# Patient Record
Sex: MALE | Race: WHITE | Employment: FULL TIME | URBAN - METROPOLITAN AREA
[De-identification: names, ages, dates, MRNs, and addresses within clinical notes are randomized per-mention and may not be internally consistent; named-entity substitution may affect disease eponyms.]

---

## 2017-06-26 ENCOUNTER — TELEPHONE (OUTPATIENT)
Dept: INTERNAL MEDICINE CLINIC | Facility: CLINIC | Age: 67
End: 2017-06-26

## 2017-06-26 ENCOUNTER — APPOINTMENT (OUTPATIENT)
Dept: LAB | Age: 67
End: 2017-06-26
Attending: INTERNAL MEDICINE
Payer: COMMERCIAL

## 2017-06-26 DIAGNOSIS — E78.2 MIXED HYPERLIPIDEMIA: ICD-10-CM

## 2017-06-26 DIAGNOSIS — I10 ESSENTIAL HYPERTENSION: ICD-10-CM

## 2017-06-26 DIAGNOSIS — E78.2 MIXED HYPERLIPIDEMIA: Primary | ICD-10-CM

## 2017-06-26 PROCEDURE — 80061 LIPID PANEL: CPT | Performed by: INTERNAL MEDICINE

## 2017-06-26 PROCEDURE — 80053 COMPREHEN METABOLIC PANEL: CPT | Performed by: INTERNAL MEDICINE

## 2017-06-26 PROCEDURE — 81003 URINALYSIS AUTO W/O SCOPE: CPT | Performed by: INTERNAL MEDICINE

## 2017-06-26 PROCEDURE — 36415 COLL VENOUS BLD VENIPUNCTURE: CPT | Performed by: INTERNAL MEDICINE

## 2017-06-26 NOTE — TELEPHONE ENCOUNTER
Patient walked up to the , and is fasting for labs, but no orders were placed. Please enter orders for [address].

## 2017-06-30 ENCOUNTER — OFFICE VISIT (OUTPATIENT)
Dept: INTERNAL MEDICINE CLINIC | Facility: CLINIC | Age: 67
End: 2017-06-30

## 2017-06-30 VITALS
TEMPERATURE: 99 F | SYSTOLIC BLOOD PRESSURE: 130 MMHG | WEIGHT: 212 LBS | RESPIRATION RATE: 14 BRPM | DIASTOLIC BLOOD PRESSURE: 86 MMHG | BODY MASS INDEX: 30.35 KG/M2 | HEART RATE: 60 BPM | HEIGHT: 70 IN

## 2017-06-30 DIAGNOSIS — E78.2 MIXED HYPERLIPIDEMIA: ICD-10-CM

## 2017-06-30 DIAGNOSIS — I10 ESSENTIAL HYPERTENSION: Primary | ICD-10-CM

## 2017-06-30 DIAGNOSIS — I25.10 CAD IN NATIVE ARTERY: ICD-10-CM

## 2017-06-30 DIAGNOSIS — Z12.11 COLON CANCER SCREENING: ICD-10-CM

## 2017-06-30 PROCEDURE — 99213 OFFICE O/P EST LOW 20 MIN: CPT | Performed by: INTERNAL MEDICINE

## 2017-06-30 RX ORDER — LOSARTAN POTASSIUM 50 MG/1
50 TABLET ORAL DAILY
Qty: 90 TABLET | Refills: 1 | Status: SHIPPED | OUTPATIENT
Start: 2017-06-30 | End: 2017-12-28

## 2017-06-30 RX ORDER — ATORVASTATIN CALCIUM 40 MG/1
40 TABLET, FILM COATED ORAL DAILY
Qty: 90 TABLET | Refills: 1 | Status: SHIPPED | OUTPATIENT
Start: 2017-06-30 | End: 2017-12-10

## 2017-06-30 NOTE — PROGRESS NOTES
HPI:    Patient ID: Veda Willis is a 69year old male. HPI  Veda Willis is a 69year old male. HPI:   Patient presents for recheck of his hypertension, hyperlipidemia and CAD.  Pt has been taking medications as instructed, no medication raquel Packs/day: 0.00      Years: 0.00         Quit date: 10/30/2013  Smokeless tobacco: Never Used                      Alcohol use:  No               Comment: quit        REVIEW OF SYSTEMS:   GENERAL H PHYSICAL EXAM:   Physical Exam           ASSESSMENT/PLAN:   Mixed hyperlipidemia  Essential hypertension  (primary encounter diagnosis)  Cad in native artery 2013 s/p mi 2013 promus elemant plus stent lad (distal) dr. Fonseca First  Colon cancer screening      Or

## 2017-06-30 NOTE — PATIENT INSTRUCTIONS
Eating Heart-Healthy Foods  Eating has a big impact on your heart health. In fact, eating healthier can improve several of your heart risks at once. For instance, it helps you manage weight, cholesterol, and blood pressure.  Here are ideas to help you radha Aim to make these foods staples of your diet. If you have diabetes, you may have different recommendations than what is listed here:  · Fruits and vegetable provide plenty of nutrients without a lot of calories.  At meals, fill half your plate with these fo · Choose ingredients that spice up your food without adding calories, fat, or sodium. Try these items: horseradish, hot sauce, lemon, mustard, nonfat salad dressings, and vinegar.  For salt-free herbs and spices, try basil, cilantro, cinnamon, pepper, and r

## 2017-10-11 ENCOUNTER — NURSE ONLY (OUTPATIENT)
Dept: INTERNAL MEDICINE CLINIC | Facility: CLINIC | Age: 67
End: 2017-10-11

## 2017-10-11 DIAGNOSIS — Z23 NEED FOR PROPHYLACTIC VACCINATION AND INOCULATION AGAINST INFLUENZA: Primary | ICD-10-CM

## 2017-10-11 PROCEDURE — 90653 IIV ADJUVANT VACCINE IM: CPT | Performed by: INTERNAL MEDICINE

## 2017-10-11 PROCEDURE — 90471 IMMUNIZATION ADMIN: CPT | Performed by: INTERNAL MEDICINE

## 2017-10-27 ENCOUNTER — MED REC SCAN ONLY (OUTPATIENT)
Dept: INTERNAL MEDICINE CLINIC | Facility: CLINIC | Age: 67
End: 2017-10-27

## 2017-12-10 DIAGNOSIS — E78.2 MIXED HYPERLIPIDEMIA: ICD-10-CM

## 2017-12-10 DIAGNOSIS — I25.10 CAD IN NATIVE ARTERY: ICD-10-CM

## 2017-12-11 RX ORDER — ATORVASTATIN CALCIUM 40 MG/1
TABLET, FILM COATED ORAL
Qty: 30 TABLET | Refills: 5 | Status: SHIPPED | OUTPATIENT
Start: 2017-12-11 | End: 2019-01-07

## 2017-12-15 ENCOUNTER — TELEPHONE (OUTPATIENT)
Dept: INTERNAL MEDICINE CLINIC | Facility: CLINIC | Age: 67
End: 2017-12-15

## 2017-12-15 DIAGNOSIS — Z13.89 SCREENING FOR BLOOD OR PROTEIN IN URINE: ICD-10-CM

## 2017-12-15 DIAGNOSIS — Z13.220 SCREENING FOR LIPOID DISORDERS: ICD-10-CM

## 2017-12-15 DIAGNOSIS — Z13.29 SCREENING FOR ENDOCRINE/METABOLIC/IMMUNITY DISORDERS: ICD-10-CM

## 2017-12-15 DIAGNOSIS — Z13.0 SCREENING FOR ENDOCRINE/METABOLIC/IMMUNITY DISORDERS: ICD-10-CM

## 2017-12-15 DIAGNOSIS — Z13.29 SCREENING FOR THYROID DISORDER: ICD-10-CM

## 2017-12-15 DIAGNOSIS — Z13.1 SCREENING FOR DIABETES MELLITUS: ICD-10-CM

## 2017-12-15 DIAGNOSIS — Z13.0 SCREENING FOR IRON DEFICIENCY ANEMIA: ICD-10-CM

## 2017-12-15 DIAGNOSIS — Z12.5 SPECIAL SCREENING FOR MALIGNANT NEOPLASM OF PROSTATE: ICD-10-CM

## 2017-12-15 DIAGNOSIS — Z13.228 SCREENING FOR ENDOCRINE/METABOLIC/IMMUNITY DISORDERS: ICD-10-CM

## 2017-12-15 NOTE — TELEPHONE ENCOUNTER
Patient called and requested labs to be ordered for his upcoming physical on 12/28/2017 with Dr. Cullen Patient.  Please enter labs for [facility] reference, and patient is aware to fast and provide U/A.

## 2017-12-18 ENCOUNTER — LAB ENCOUNTER (OUTPATIENT)
Dept: LAB | Age: 67
End: 2017-12-18
Attending: INTERNAL MEDICINE
Payer: COMMERCIAL

## 2017-12-18 ENCOUNTER — APPOINTMENT (OUTPATIENT)
Dept: LAB | Facility: HOSPITAL | Age: 67
End: 2017-12-18
Attending: INTERNAL MEDICINE
Payer: COMMERCIAL

## 2017-12-18 DIAGNOSIS — Z13.1 SCREENING FOR DIABETES MELLITUS: ICD-10-CM

## 2017-12-18 DIAGNOSIS — Z13.220 SCREENING FOR LIPOID DISORDERS: ICD-10-CM

## 2017-12-18 DIAGNOSIS — Z12.5 SPECIAL SCREENING FOR MALIGNANT NEOPLASM OF PROSTATE: ICD-10-CM

## 2017-12-18 DIAGNOSIS — Z13.0 SCREENING FOR IRON DEFICIENCY ANEMIA: ICD-10-CM

## 2017-12-18 DIAGNOSIS — Z13.29 SCREENING FOR ENDOCRINE/METABOLIC/IMMUNITY DISORDERS: ICD-10-CM

## 2017-12-18 DIAGNOSIS — Z13.89 SCREENING FOR BLOOD OR PROTEIN IN URINE: ICD-10-CM

## 2017-12-18 DIAGNOSIS — Z13.0 SCREENING FOR ENDOCRINE/METABOLIC/IMMUNITY DISORDERS: ICD-10-CM

## 2017-12-18 DIAGNOSIS — Z13.228 SCREENING FOR ENDOCRINE/METABOLIC/IMMUNITY DISORDERS: ICD-10-CM

## 2017-12-18 DIAGNOSIS — Z13.29 SCREENING FOR THYROID DISORDER: ICD-10-CM

## 2017-12-18 DIAGNOSIS — Z12.11 COLON CANCER SCREENING: ICD-10-CM

## 2017-12-18 PROCEDURE — 81001 URINALYSIS AUTO W/SCOPE: CPT | Performed by: INTERNAL MEDICINE

## 2017-12-18 PROCEDURE — 82272 OCCULT BLD FECES 1-3 TESTS: CPT

## 2017-12-18 PROCEDURE — 36415 COLL VENOUS BLD VENIPUNCTURE: CPT | Performed by: INTERNAL MEDICINE

## 2017-12-18 PROCEDURE — 80061 LIPID PANEL: CPT | Performed by: INTERNAL MEDICINE

## 2017-12-18 PROCEDURE — 84153 ASSAY OF PSA TOTAL: CPT | Performed by: INTERNAL MEDICINE

## 2017-12-18 PROCEDURE — 80050 GENERAL HEALTH PANEL: CPT | Performed by: INTERNAL MEDICINE

## 2017-12-18 PROCEDURE — 83036 HEMOGLOBIN GLYCOSYLATED A1C: CPT | Performed by: INTERNAL MEDICINE

## 2017-12-28 ENCOUNTER — OFFICE VISIT (OUTPATIENT)
Dept: INTERNAL MEDICINE CLINIC | Facility: CLINIC | Age: 67
End: 2017-12-28

## 2017-12-28 VITALS
HEART RATE: 68 BPM | OXYGEN SATURATION: 97 % | HEIGHT: 70 IN | DIASTOLIC BLOOD PRESSURE: 88 MMHG | SYSTOLIC BLOOD PRESSURE: 130 MMHG | TEMPERATURE: 98 F | WEIGHT: 212 LBS | RESPIRATION RATE: 16 BRPM | BODY MASS INDEX: 30.35 KG/M2

## 2017-12-28 DIAGNOSIS — E87.5 HYPERKALEMIA: ICD-10-CM

## 2017-12-28 DIAGNOSIS — Z00.00 PHYSICAL EXAM, ANNUAL: Primary | ICD-10-CM

## 2017-12-28 DIAGNOSIS — Z12.11 COLON CANCER SCREENING: ICD-10-CM

## 2017-12-28 DIAGNOSIS — I10 ESSENTIAL HYPERTENSION: ICD-10-CM

## 2017-12-28 PROCEDURE — 99397 PER PM REEVAL EST PAT 65+ YR: CPT | Performed by: INTERNAL MEDICINE

## 2017-12-28 RX ORDER — LOSARTAN POTASSIUM 50 MG/1
50 TABLET ORAL DAILY
Qty: 90 TABLET | Refills: 1 | Status: SHIPPED | OUTPATIENT
Start: 2017-12-28 | End: 2018-06-12

## 2017-12-28 NOTE — PROGRESS NOTES
HPI:    Patient ID: Galo Moore is a 69year old male. HPI  Galo Moore is a 69year old male who presents for a complete physical exam.   HPI:   Pt complains of nothing    PAST MEDICAL, SOCIAL, FAMILY HISTORIES REVIEWED WITH PT  .       Ambrosio Small ofloxacin 0.3 % Ophthalmic Solution 1 drop 4 times a day to operated eye starting 1 day prior to surgery and every 2 hours after surgery Disp: 1 Bottle Rfl: 1   prednisoLONE acetate (PRED FORTE) 1 % Ophthalmic Suspension 1 drop to affected eye QID Disp: EOMI, normal optic disk,conjunctiva are clear  NECK: supple,no adenopathy,no bruits  LUNGS: clear to auscultation  CARDIO: RRR without murmur  GI: good BS's,no masses, HSM or tenderness  : two descended testes,no masses,no hernia,no penile lesions  RECTA Essential hypertension  Colon cancer screening  Hyperkalemia  Physical exam, annual  (primary encounter diagnosis)      Orders Placed This Encounter      Occult Blood, Fecal, Immunoassay (FIT) [E]      Potassium [E]    Meds This Visit:  Signed Kaylen Chandler

## 2018-05-11 ENCOUNTER — MED REC SCAN ONLY (OUTPATIENT)
Dept: INTERNAL MEDICINE CLINIC | Facility: CLINIC | Age: 68
End: 2018-05-11

## 2018-05-29 ENCOUNTER — LAB ENCOUNTER (OUTPATIENT)
Dept: LAB | Facility: HOSPITAL | Age: 68
End: 2018-05-29
Attending: INTERNAL MEDICINE
Payer: COMMERCIAL

## 2018-05-29 DIAGNOSIS — Z12.11 SPECIAL SCREENING FOR MALIGNANT NEOPLASMS, COLON: Primary | ICD-10-CM

## 2018-05-29 PROCEDURE — 82272 OCCULT BLD FECES 1-3 TESTS: CPT

## 2018-06-12 DIAGNOSIS — I10 ESSENTIAL HYPERTENSION: ICD-10-CM

## 2018-06-12 RX ORDER — LOSARTAN POTASSIUM 50 MG/1
TABLET ORAL
Qty: 30 TABLET | Refills: 1 | Status: SHIPPED | OUTPATIENT
Start: 2018-06-12 | End: 2018-07-05 | Stop reason: ALTCHOICE

## 2018-06-27 ENCOUNTER — TELEPHONE (OUTPATIENT)
Dept: INTERNAL MEDICINE CLINIC | Facility: CLINIC | Age: 68
End: 2018-06-27

## 2018-06-27 DIAGNOSIS — E78.2 MIXED HYPERLIPIDEMIA: Primary | ICD-10-CM

## 2018-06-27 DIAGNOSIS — I10 ESSENTIAL HYPERTENSION: ICD-10-CM

## 2018-06-27 DIAGNOSIS — I25.10 CAD IN NATIVE ARTERY: ICD-10-CM

## 2018-06-27 NOTE — TELEPHONE ENCOUNTER
Las physical was 12/2017 and not due for full panel until 12/2018.    Follow up labs ordered per Dr. Maryse Beasley

## 2018-06-28 ENCOUNTER — LAB ENCOUNTER (OUTPATIENT)
Dept: LAB | Age: 68
End: 2018-06-28
Attending: INTERNAL MEDICINE
Payer: COMMERCIAL

## 2018-06-28 DIAGNOSIS — E78.2 MIXED HYPERLIPIDEMIA: ICD-10-CM

## 2018-06-28 DIAGNOSIS — I10 ESSENTIAL HYPERTENSION: ICD-10-CM

## 2018-06-28 DIAGNOSIS — I25.10 CAD IN NATIVE ARTERY: ICD-10-CM

## 2018-06-28 PROCEDURE — 81003 URINALYSIS AUTO W/O SCOPE: CPT | Performed by: INTERNAL MEDICINE

## 2018-06-28 PROCEDURE — 85025 COMPLETE CBC W/AUTO DIFF WBC: CPT | Performed by: INTERNAL MEDICINE

## 2018-06-28 PROCEDURE — 80053 COMPREHEN METABOLIC PANEL: CPT | Performed by: INTERNAL MEDICINE

## 2018-06-28 PROCEDURE — 36415 COLL VENOUS BLD VENIPUNCTURE: CPT | Performed by: INTERNAL MEDICINE

## 2018-07-05 ENCOUNTER — OFFICE VISIT (OUTPATIENT)
Dept: INTERNAL MEDICINE CLINIC | Facility: CLINIC | Age: 68
End: 2018-07-05

## 2018-07-05 VITALS
SYSTOLIC BLOOD PRESSURE: 116 MMHG | BODY MASS INDEX: 30.06 KG/M2 | WEIGHT: 210 LBS | RESPIRATION RATE: 16 BRPM | DIASTOLIC BLOOD PRESSURE: 86 MMHG | HEART RATE: 57 BPM | TEMPERATURE: 98 F | HEIGHT: 70 IN

## 2018-07-05 DIAGNOSIS — I10 ESSENTIAL HYPERTENSION: Primary | ICD-10-CM

## 2018-07-05 DIAGNOSIS — E78.2 MIXED HYPERLIPIDEMIA: ICD-10-CM

## 2018-07-05 PROCEDURE — 99213 OFFICE O/P EST LOW 20 MIN: CPT | Performed by: INTERNAL MEDICINE

## 2018-07-05 RX ORDER — LOSARTAN POTASSIUM AND HYDROCHLOROTHIAZIDE 12.5; 5 MG/1; MG/1
1 TABLET ORAL
COMMUNITY
Start: 2018-05-02 | End: 2019-01-07

## 2018-07-05 NOTE — PROGRESS NOTES
Zacarias Richradson is a 77year old male. HPI:   Patient presents for recheck of his hypertension and hyperlipidemia. Pt has been taking medications as instructed, no medication side effects, denies home BP monitoring. Denies any complaints today.   Katey Márquez Smoking status: Former Smoker                                                              Packs/day: 0.00      Years: 0.00         Quit date: 10/30/2013  Smokeless tobacco: Never Used                      Alcohol use:  No               Comment: quit

## 2018-08-06 DIAGNOSIS — I10 ESSENTIAL HYPERTENSION: ICD-10-CM

## 2018-08-06 RX ORDER — LOSARTAN POTASSIUM 50 MG/1
TABLET ORAL
Qty: 30 TABLET | Refills: 0 | OUTPATIENT
Start: 2018-08-06

## 2018-12-27 ENCOUNTER — APPOINTMENT (OUTPATIENT)
Dept: LAB | Age: 68
End: 2018-12-27
Attending: INTERNAL MEDICINE
Payer: COMMERCIAL

## 2018-12-27 DIAGNOSIS — E87.5 HYPERKALEMIA: ICD-10-CM

## 2018-12-27 PROCEDURE — 36415 COLL VENOUS BLD VENIPUNCTURE: CPT | Performed by: INTERNAL MEDICINE

## 2018-12-27 PROCEDURE — 84132 ASSAY OF SERUM POTASSIUM: CPT | Performed by: INTERNAL MEDICINE

## 2019-01-07 ENCOUNTER — LAB ENCOUNTER (OUTPATIENT)
Dept: LAB | Age: 69
End: 2019-01-07
Attending: INTERNAL MEDICINE
Payer: COMMERCIAL

## 2019-01-07 ENCOUNTER — OFFICE VISIT (OUTPATIENT)
Dept: INTERNAL MEDICINE CLINIC | Facility: CLINIC | Age: 69
End: 2019-01-07
Payer: COMMERCIAL

## 2019-01-07 VITALS
HEIGHT: 70 IN | WEIGHT: 215 LBS | BODY MASS INDEX: 30.78 KG/M2 | SYSTOLIC BLOOD PRESSURE: 130 MMHG | DIASTOLIC BLOOD PRESSURE: 88 MMHG | TEMPERATURE: 98 F | HEART RATE: 63 BPM | RESPIRATION RATE: 16 BRPM

## 2019-01-07 DIAGNOSIS — Z13.0 SCREENING, IRON DEFICIENCY ANEMIA: ICD-10-CM

## 2019-01-07 DIAGNOSIS — E78.2 MIXED HYPERLIPIDEMIA: ICD-10-CM

## 2019-01-07 DIAGNOSIS — Z00.00 ANNUAL PHYSICAL EXAM: Primary | ICD-10-CM

## 2019-01-07 DIAGNOSIS — Z12.11 COLON CANCER SCREENING: ICD-10-CM

## 2019-01-07 DIAGNOSIS — Z13.220 LIPID SCREENING: ICD-10-CM

## 2019-01-07 DIAGNOSIS — Z00.00 LABORATORY EXAMINATION ORDERED AS PART OF A ROUTINE GENERAL MEDICAL EXAMINATION: ICD-10-CM

## 2019-01-07 DIAGNOSIS — I25.10 CAD IN NATIVE ARTERY: ICD-10-CM

## 2019-01-07 DIAGNOSIS — Z12.5 PROSTATE CANCER SCREENING: ICD-10-CM

## 2019-01-07 DIAGNOSIS — Z13.29 THYROID DISORDER SCREEN: ICD-10-CM

## 2019-01-07 DIAGNOSIS — I10 ESSENTIAL HYPERTENSION: ICD-10-CM

## 2019-01-07 DIAGNOSIS — Z13.89 SCREENING FOR GENITOURINARY CONDITION: ICD-10-CM

## 2019-01-07 LAB
ALBUMIN SERPL-MCNC: 3.9 G/DL (ref 3.1–4.5)
ALBUMIN/GLOB SERPL: 0.9 {RATIO} (ref 1–2)
ALP LIVER SERPL-CCNC: 124 U/L (ref 45–117)
ALT SERPL-CCNC: 32 U/L (ref 17–63)
ANION GAP SERPL CALC-SCNC: 5 MMOL/L (ref 0–18)
AST SERPL-CCNC: 22 U/L (ref 15–41)
BASOPHILS # BLD AUTO: 0.02 X10(3) UL (ref 0–0.1)
BASOPHILS NFR BLD AUTO: 0.2 %
BILIRUB SERPL-MCNC: 0.6 MG/DL (ref 0.1–2)
BILIRUB UR QL STRIP.AUTO: NEGATIVE
BUN BLD-MCNC: 20 MG/DL (ref 8–20)
BUN/CREAT SERPL: 15.9 (ref 10–20)
CALCIUM BLD-MCNC: 9.2 MG/DL (ref 8.3–10.3)
CHLORIDE SERPL-SCNC: 107 MMOL/L (ref 101–111)
CHOLEST SMN-MCNC: 178 MG/DL (ref ?–200)
CO2 SERPL-SCNC: 29 MMOL/L (ref 22–32)
COLOR UR AUTO: YELLOW
COMPLEXED PSA SERPL-MCNC: 3.75 NG/ML (ref 0.01–4)
CREAT BLD-MCNC: 1.26 MG/DL (ref 0.7–1.3)
EOSINOPHIL # BLD AUTO: 0.17 X10(3) UL (ref 0–0.3)
EOSINOPHIL NFR BLD AUTO: 2.1 %
ERYTHROCYTE [DISTWIDTH] IN BLOOD BY AUTOMATED COUNT: 14.6 % (ref 11.5–16)
EST. AVERAGE GLUCOSE BLD GHB EST-MCNC: 131 MG/DL (ref 68–126)
GLOBULIN PLAS-MCNC: 4.2 G/DL (ref 2.8–4.4)
GLUCOSE BLD-MCNC: 102 MG/DL (ref 70–99)
GLUCOSE UR STRIP.AUTO-MCNC: NEGATIVE MG/DL
HBA1C MFR BLD HPLC: 6.2 % (ref ?–5.7)
HCT VFR BLD AUTO: 45.1 % (ref 37–53)
HDLC SERPL-MCNC: 62 MG/DL (ref 40–59)
HGB BLD-MCNC: 14.3 G/DL (ref 13–17)
IMMATURE GRANULOCYTE COUNT: 0.04 X10(3) UL (ref 0–1)
IMMATURE GRANULOCYTE RATIO %: 0.5 %
KETONES UR STRIP.AUTO-MCNC: NEGATIVE MG/DL
LDLC SERPL CALC-MCNC: 103 MG/DL (ref ?–100)
LEUKOCYTE ESTERASE UR QL STRIP.AUTO: NEGATIVE
LYMPHOCYTES # BLD AUTO: 1.88 X10(3) UL (ref 0.9–4)
LYMPHOCYTES NFR BLD AUTO: 23.5 %
M PROTEIN MFR SERPL ELPH: 8.1 G/DL (ref 6.4–8.2)
MCH RBC QN AUTO: 29.9 PG (ref 27–33.2)
MCHC RBC AUTO-ENTMCNC: 31.7 G/DL (ref 31–37)
MCV RBC AUTO: 94.4 FL (ref 80–99)
MONOCYTES # BLD AUTO: 1.07 X10(3) UL (ref 0.1–1)
MONOCYTES NFR BLD AUTO: 13.4 %
NEUTROPHIL ABS PRELIM: 4.83 X10 (3) UL (ref 1.3–6.7)
NEUTROPHILS # BLD AUTO: 4.83 X10(3) UL (ref 1.3–6.7)
NEUTROPHILS NFR BLD AUTO: 60.3 %
NITRITE UR QL STRIP.AUTO: NEGATIVE
NONHDLC SERPL-MCNC: 116 MG/DL (ref ?–130)
OSMOLALITY SERPL CALC.SUM OF ELEC: 295 MOSM/KG (ref 275–295)
PH UR STRIP.AUTO: 6 [PH] (ref 4.5–8)
PLATELET # BLD AUTO: 290 10(3)UL (ref 150–450)
POTASSIUM SERPL-SCNC: 4.6 MMOL/L (ref 3.6–5.1)
PROT UR STRIP.AUTO-MCNC: NEGATIVE MG/DL
RBC # BLD AUTO: 4.78 X10(6)UL (ref 3.8–5.8)
RBC #/AREA URNS AUTO: >10 /HPF
RED CELL DISTRIBUTION WIDTH-SD: 51.5 FL (ref 35.1–46.3)
SODIUM SERPL-SCNC: 141 MMOL/L (ref 136–144)
SP GR UR STRIP.AUTO: 1.02 (ref 1–1.03)
TRIGL SERPL-MCNC: 65 MG/DL (ref 30–149)
TSI SER-ACNC: 2.72 MIU/ML (ref 0.35–5.5)
UROBILINOGEN UR STRIP.AUTO-MCNC: <2 MG/DL
VLDLC SERPL CALC-MCNC: 13 MG/DL (ref 0–30)
WBC # BLD AUTO: 8 X10(3) UL (ref 4–13)

## 2019-01-07 PROCEDURE — 81001 URINALYSIS AUTO W/SCOPE: CPT | Performed by: INTERNAL MEDICINE

## 2019-01-07 PROCEDURE — 99397 PER PM REEVAL EST PAT 65+ YR: CPT | Performed by: INTERNAL MEDICINE

## 2019-01-07 PROCEDURE — 80050 GENERAL HEALTH PANEL: CPT | Performed by: INTERNAL MEDICINE

## 2019-01-07 PROCEDURE — 80061 LIPID PANEL: CPT | Performed by: INTERNAL MEDICINE

## 2019-01-07 PROCEDURE — 84153 ASSAY OF PSA TOTAL: CPT | Performed by: INTERNAL MEDICINE

## 2019-01-07 PROCEDURE — 83036 HEMOGLOBIN GLYCOSYLATED A1C: CPT | Performed by: INTERNAL MEDICINE

## 2019-01-07 PROCEDURE — 36415 COLL VENOUS BLD VENIPUNCTURE: CPT | Performed by: INTERNAL MEDICINE

## 2019-01-07 RX ORDER — LOSARTAN POTASSIUM AND HYDROCHLOROTHIAZIDE 12.5; 5 MG/1; MG/1
1 TABLET ORAL DAILY
Qty: 90 TABLET | Refills: 1 | Status: SHIPPED | OUTPATIENT
Start: 2019-01-07 | End: 2019-07-12

## 2019-01-07 RX ORDER — ATORVASTATIN CALCIUM 40 MG/1
40 TABLET, FILM COATED ORAL
Qty: 90 TABLET | Refills: 1 | Status: SHIPPED | OUTPATIENT
Start: 2019-01-07 | End: 2019-07-12

## 2019-01-08 ENCOUNTER — TELEPHONE (OUTPATIENT)
Dept: INTERNAL MEDICINE CLINIC | Facility: CLINIC | Age: 69
End: 2019-01-08

## 2019-01-08 DIAGNOSIS — R31.9 HEMATURIA, UNSPECIFIED TYPE: Primary | ICD-10-CM

## 2019-01-08 NOTE — TELEPHONE ENCOUNTER
Relayed results to patient and he verbalized understanding and agreed with plan. States he does not have any concerning symptoms at this time and will call back for Dr. Alejandro Beasley information when needed. Referral placed.

## 2019-01-08 NOTE — TELEPHONE ENCOUNTER
Jasmeet Calle MD   Urology   [facility]     [facility], [address] [place] [address] Rd   Phone: [phone]     Referral pending. LMOVM TCOB.

## 2019-01-08 NOTE — TELEPHONE ENCOUNTER
----- Message from Chencho Potter MD sent at 1/8/2019 10:04 AM CST -----  No dm2 but prediabetes per a1c  Renal/lft are normal  Thyroid functions are normal  UA again with hematuria. Ct 2015 showed small renal stones.  Refer to urology (Dr Radha Menon) for eval  flp

## 2019-05-03 ENCOUNTER — MED REC SCAN ONLY (OUTPATIENT)
Dept: INTERNAL MEDICINE CLINIC | Facility: CLINIC | Age: 69
End: 2019-05-03

## 2019-06-13 PROCEDURE — 82274 ASSAY TEST FOR BLOOD FECAL: CPT

## 2019-06-18 ENCOUNTER — APPOINTMENT (OUTPATIENT)
Dept: LAB | Facility: HOSPITAL | Age: 69
End: 2019-06-18
Attending: INTERNAL MEDICINE
Payer: COMMERCIAL

## 2019-06-18 DIAGNOSIS — Z12.11 COLON CANCER SCREENING: ICD-10-CM

## 2019-07-12 ENCOUNTER — OFFICE VISIT (OUTPATIENT)
Dept: INTERNAL MEDICINE CLINIC | Facility: CLINIC | Age: 69
End: 2019-07-12
Payer: COMMERCIAL

## 2019-07-12 VITALS
WEIGHT: 212 LBS | BODY MASS INDEX: 30.35 KG/M2 | DIASTOLIC BLOOD PRESSURE: 82 MMHG | SYSTOLIC BLOOD PRESSURE: 132 MMHG | HEIGHT: 70 IN | RESPIRATION RATE: 16 BRPM | TEMPERATURE: 98 F | HEART RATE: 57 BPM

## 2019-07-12 DIAGNOSIS — I10 ESSENTIAL HYPERTENSION: Primary | ICD-10-CM

## 2019-07-12 PROCEDURE — 99213 OFFICE O/P EST LOW 20 MIN: CPT | Performed by: INTERNAL MEDICINE

## 2019-07-12 RX ORDER — LOSARTAN POTASSIUM AND HYDROCHLOROTHIAZIDE 12.5; 5 MG/1; MG/1
1 TABLET ORAL DAILY
Qty: 90 TABLET | Refills: 1 | Status: SHIPPED | OUTPATIENT
Start: 2019-07-12 | End: 2020-01-03

## 2019-07-12 RX ORDER — ATORVASTATIN CALCIUM 40 MG/1
40 TABLET, FILM COATED ORAL
Qty: 90 TABLET | Refills: 1 | Status: SHIPPED | OUTPATIENT
Start: 2019-07-12

## 2019-07-12 NOTE — PROGRESS NOTES
HPI:    Patient ID: Rashida Lobo is a 68year old male. HPI  Rashida Lobo is a 68year old male. HPI:   Patient presents for recheck of his hypertension.  Pt has been taking medications as instructed, no medication side effects, home BP monit 10/30/2013        Years since quittin.7      Smokeless tobacco: Never Used    Alcohol use: No      Comment: quit    Drug use: No        REVIEW OF SYSTEMS:   GENERAL HEALTH: feels well otherwise  SKIN: denies any unusual skin lesions or rashes  RESPIRAT Prescriptions Disp Refills   • Losartan Potassium-HCTZ 50-12.5 MG Oral Tab 90 tablet 1     Sig: Take 1 tablet by mouth daily. • metoprolol Tartrate 25 MG Oral Tab 180 tablet 1     Sig: Take 0.5 tablets (12.5 mg total) by mouth 2 (two) times daily.    • at

## 2019-11-12 ENCOUNTER — MED REC SCAN ONLY (OUTPATIENT)
Dept: INTERNAL MEDICINE CLINIC | Facility: CLINIC | Age: 69
End: 2019-11-12

## 2020-01-03 ENCOUNTER — LAB ENCOUNTER (OUTPATIENT)
Dept: LAB | Age: 70
End: 2020-01-03
Attending: INTERNAL MEDICINE
Payer: COMMERCIAL

## 2020-01-03 ENCOUNTER — OFFICE VISIT (OUTPATIENT)
Dept: INTERNAL MEDICINE CLINIC | Facility: CLINIC | Age: 70
End: 2020-01-03
Payer: COMMERCIAL

## 2020-01-03 VITALS
RESPIRATION RATE: 20 BRPM | TEMPERATURE: 98 F | HEART RATE: 74 BPM | OXYGEN SATURATION: 97 % | WEIGHT: 214 LBS | DIASTOLIC BLOOD PRESSURE: 86 MMHG | BODY MASS INDEX: 32.81 KG/M2 | SYSTOLIC BLOOD PRESSURE: 132 MMHG | HEIGHT: 67.75 IN

## 2020-01-03 DIAGNOSIS — Z13.89 SCREENING FOR GENITOURINARY CONDITION: ICD-10-CM

## 2020-01-03 DIAGNOSIS — Z13.29 THYROID DISORDER SCREEN: ICD-10-CM

## 2020-01-03 DIAGNOSIS — Z13.0 SCREENING, IRON DEFICIENCY ANEMIA: ICD-10-CM

## 2020-01-03 DIAGNOSIS — Z13.220 LIPID SCREENING: ICD-10-CM

## 2020-01-03 DIAGNOSIS — Z00.00 LABORATORY EXAMINATION ORDERED AS PART OF A ROUTINE GENERAL MEDICAL EXAMINATION: ICD-10-CM

## 2020-01-03 DIAGNOSIS — Z12.5 PROSTATE CANCER SCREENING: ICD-10-CM

## 2020-01-03 DIAGNOSIS — Z00.00 ANNUAL PHYSICAL EXAM: Primary | ICD-10-CM

## 2020-01-03 DIAGNOSIS — I10 ESSENTIAL HYPERTENSION: ICD-10-CM

## 2020-01-03 DIAGNOSIS — I25.10 CAD IN NATIVE ARTERY: ICD-10-CM

## 2020-01-03 LAB
ALBUMIN SERPL-MCNC: 4.1 G/DL (ref 3.4–5)
ALBUMIN/GLOB SERPL: 1 {RATIO} (ref 1–2)
ALP LIVER SERPL-CCNC: 112 U/L (ref 45–117)
ALT SERPL-CCNC: 39 U/L (ref 16–61)
ANION GAP SERPL CALC-SCNC: 8 MMOL/L (ref 0–18)
AST SERPL-CCNC: 22 U/L (ref 15–37)
BASOPHILS # BLD AUTO: 0.03 X10(3) UL (ref 0–0.2)
BASOPHILS NFR BLD AUTO: 0.4 %
BILIRUB SERPL-MCNC: 1 MG/DL (ref 0.1–2)
BILIRUB UR QL STRIP.AUTO: NEGATIVE
BUN BLD-MCNC: 17 MG/DL (ref 7–18)
BUN/CREAT SERPL: 14.8 (ref 10–20)
CALCIUM BLD-MCNC: 9.6 MG/DL (ref 8.5–10.1)
CHLORIDE SERPL-SCNC: 104 MMOL/L (ref 98–112)
CHOLEST SMN-MCNC: 189 MG/DL (ref ?–200)
CLARITY UR REFRACT.AUTO: CLEAR
CO2 SERPL-SCNC: 25 MMOL/L (ref 21–32)
COMPLEXED PSA SERPL-MCNC: 2.29 NG/ML (ref ?–4)
CREAT BLD-MCNC: 1.15 MG/DL (ref 0.7–1.3)
DEPRECATED RDW RBC AUTO: 50.4 FL (ref 35.1–46.3)
EOSINOPHIL # BLD AUTO: 0.16 X10(3) UL (ref 0–0.7)
EOSINOPHIL NFR BLD AUTO: 2 %
ERYTHROCYTE [DISTWIDTH] IN BLOOD BY AUTOMATED COUNT: 14.6 % (ref 11–15)
EST. AVERAGE GLUCOSE BLD GHB EST-MCNC: 131 MG/DL (ref 68–126)
GLOBULIN PLAS-MCNC: 4 G/DL (ref 2.8–4.4)
GLUCOSE BLD-MCNC: 98 MG/DL (ref 70–99)
GLUCOSE UR STRIP.AUTO-MCNC: NEGATIVE MG/DL
HBA1C MFR BLD HPLC: 6.2 % (ref ?–5.7)
HCT VFR BLD AUTO: 45 % (ref 39–53)
HDLC SERPL-MCNC: 63 MG/DL (ref 40–59)
HGB BLD-MCNC: 14.7 G/DL (ref 13–17.5)
IMM GRANULOCYTES # BLD AUTO: 0.04 X10(3) UL (ref 0–1)
IMM GRANULOCYTES NFR BLD: 0.5 %
KETONES UR STRIP.AUTO-MCNC: NEGATIVE MG/DL
LDLC SERPL CALC-MCNC: 108 MG/DL (ref ?–100)
LEUKOCYTE ESTERASE UR QL STRIP.AUTO: NEGATIVE
LYMPHOCYTES # BLD AUTO: 1.75 X10(3) UL (ref 1–4)
LYMPHOCYTES NFR BLD AUTO: 21.6 %
M PROTEIN MFR SERPL ELPH: 8.1 G/DL (ref 6.4–8.2)
MCH RBC QN AUTO: 30.5 PG (ref 26–34)
MCHC RBC AUTO-ENTMCNC: 32.7 G/DL (ref 31–37)
MCV RBC AUTO: 93.4 FL (ref 80–100)
MONOCYTES # BLD AUTO: 1.21 X10(3) UL (ref 0.1–1)
MONOCYTES NFR BLD AUTO: 14.9 %
NEUTROPHILS # BLD AUTO: 4.92 X10 (3) UL (ref 1.5–7.7)
NEUTROPHILS # BLD AUTO: 4.92 X10(3) UL (ref 1.5–7.7)
NEUTROPHILS NFR BLD AUTO: 60.6 %
NITRITE UR QL STRIP.AUTO: NEGATIVE
NONHDLC SERPL-MCNC: 126 MG/DL (ref ?–130)
OSMOLALITY SERPL CALC.SUM OF ELEC: 286 MOSM/KG (ref 275–295)
PATIENT FASTING Y/N/NP: YES
PATIENT FASTING Y/N/NP: YES
PH UR STRIP.AUTO: 5 [PH] (ref 4.5–8)
PLATELET # BLD AUTO: 247 10(3)UL (ref 150–450)
POTASSIUM SERPL-SCNC: 4.9 MMOL/L (ref 3.5–5.1)
PROT UR STRIP.AUTO-MCNC: NEGATIVE MG/DL
RBC # BLD AUTO: 4.82 X10(6)UL (ref 3.8–5.8)
SODIUM SERPL-SCNC: 137 MMOL/L (ref 136–145)
SP GR UR STRIP.AUTO: 1.01 (ref 1–1.03)
TRIGL SERPL-MCNC: 92 MG/DL (ref 30–149)
TSI SER-ACNC: 2.42 MIU/ML (ref 0.36–3.74)
UROBILINOGEN UR STRIP.AUTO-MCNC: <2 MG/DL
VLDLC SERPL CALC-MCNC: 18 MG/DL (ref 0–30)
WBC # BLD AUTO: 8.1 X10(3) UL (ref 4–11)

## 2020-01-03 PROCEDURE — 80050 GENERAL HEALTH PANEL: CPT | Performed by: INTERNAL MEDICINE

## 2020-01-03 PROCEDURE — 99397 PER PM REEVAL EST PAT 65+ YR: CPT | Performed by: INTERNAL MEDICINE

## 2020-01-03 PROCEDURE — 36415 COLL VENOUS BLD VENIPUNCTURE: CPT | Performed by: INTERNAL MEDICINE

## 2020-01-03 PROCEDURE — 84153 ASSAY OF PSA TOTAL: CPT | Performed by: INTERNAL MEDICINE

## 2020-01-03 PROCEDURE — 83036 HEMOGLOBIN GLYCOSYLATED A1C: CPT | Performed by: INTERNAL MEDICINE

## 2020-01-03 PROCEDURE — 81001 URINALYSIS AUTO W/SCOPE: CPT | Performed by: INTERNAL MEDICINE

## 2020-01-03 PROCEDURE — 80061 LIPID PANEL: CPT | Performed by: INTERNAL MEDICINE

## 2020-01-03 NOTE — PROGRESS NOTES
HPI:    Patient ID: Aura Red is a 68year old male. HPI  Aura Red is a 68year old male who presents for a complete physical exam.   HPI:   Pt complains of nothing.  Reports normal state of health  Sees cardiology at [facility] 12/18/2017    PSA 2.530 12/14/2016        Current Outpatient Medications   Medication Sig Dispense Refill   • losartan Potassium 50 MG Oral Tab      • metoprolol Tartrate 25 MG Oral Tab Take 0.5 tablets (12.5 mg total) by mouth 2 (two) times daily.  180 tab atraumatic, normocephalic,ears and throat are clear  EYES:PERRLA, EOMI, normal optic disk,conjunctiva are clear  NECK: supple,no adenopathy,no bruits  LUNGS: clear to auscultation  CARDIO: RRR without murmur  GI: good BS's,no masses, HSM or tenderness  : screening  Lipid screening  Screening, iron deficiency anemia    Orders Placed This Encounter      CBC W/DIFF      COMP METABOLIC PANEL      HGB X4S      LIPID PANEL      TSH W REFLEX TO FREE T4      URINALYSIS, ROUTINE      PSA (Screening) [E]      Meds T

## 2020-03-09 ENCOUNTER — HOSPITAL ENCOUNTER (OUTPATIENT)
Dept: CV DIAGNOSTICS | Age: 70
Discharge: HOME | End: 2020-03-09
Attending: INTERNAL MEDICINE
Payer: COMMERCIAL

## 2020-03-09 DIAGNOSIS — I10 ESSENTIAL HYPERTENSION: ICD-10-CM

## 2020-03-09 DIAGNOSIS — I25.10 CAD IN NATIVE ARTERY: ICD-10-CM

## 2020-03-09 PROCEDURE — 93306 TTE W/DOPPLER COMPLETE: CPT | Performed by: INTERNAL MEDICINE

## 2020-06-23 ENCOUNTER — OFFICE VISIT (OUTPATIENT)
Dept: INTERNAL MEDICINE CLINIC | Facility: CLINIC | Age: 70
End: 2020-06-23
Payer: COMMERCIAL

## 2020-06-23 VITALS
BODY MASS INDEX: 32.81 KG/M2 | TEMPERATURE: 98 F | DIASTOLIC BLOOD PRESSURE: 80 MMHG | HEIGHT: 67.75 IN | WEIGHT: 214 LBS | RESPIRATION RATE: 16 BRPM | OXYGEN SATURATION: 97 % | HEART RATE: 74 BPM | SYSTOLIC BLOOD PRESSURE: 138 MMHG

## 2020-06-23 DIAGNOSIS — I10 ESSENTIAL HYPERTENSION: Primary | ICD-10-CM

## 2020-06-23 DIAGNOSIS — Z12.11 COLON CANCER SCREENING: ICD-10-CM

## 2020-06-23 DIAGNOSIS — E78.2 MIXED HYPERLIPIDEMIA: ICD-10-CM

## 2020-06-23 DIAGNOSIS — I25.10 CAD IN NATIVE ARTERY: ICD-10-CM

## 2020-06-23 PROCEDURE — 99214 OFFICE O/P EST MOD 30 MIN: CPT | Performed by: INTERNAL MEDICINE

## 2020-06-23 NOTE — PROGRESS NOTES
HPI:    Patient ID: Anne Pham is a 65year old male. Hypertension       Anne Pham is a 65year old male. HPI:   Patient presents for recheck of his hypertension, hyperlipidemia and CAD.  Pt has been taking medications as instructed, no ([address] 10/19/2013   • Hyperlipidemia       Past Surgical History:   Procedure Laterality Date   • HC EMBOLIZATION STENT IMPLANT        Social History:    Social History    Tobacco Use      Smoking status: Former Smoker        Quit date: 10/30/2013        Wen Mai mg total) by mouth once daily. 90 tablet 1     Allergies:   Ace Inhibitors          Coughing  Shellfish-Derived P*    HIVES   PHYSICAL EXAM:   Physical Exam           ASSESSMENT/PLAN:   Essential hypertension  (primary encounter diagnosis)  Mixed hyperlipid

## 2020-07-01 ENCOUNTER — MED REC SCAN ONLY (OUTPATIENT)
Dept: INTERNAL MEDICINE CLINIC | Facility: CLINIC | Age: 70
End: 2020-07-01

## 2020-11-06 ENCOUNTER — LAB ENCOUNTER (OUTPATIENT)
Dept: LAB | Facility: HOSPITAL | Age: 70
End: 2020-11-06
Attending: INTERNAL MEDICINE
Payer: COMMERCIAL

## 2020-11-06 DIAGNOSIS — Z12.11 COLON CANCER SCREENING: ICD-10-CM

## 2020-11-06 PROCEDURE — 82274 ASSAY TEST FOR BLOOD FECAL: CPT

## 2021-01-05 ENCOUNTER — LAB ENCOUNTER (OUTPATIENT)
Dept: LAB | Age: 71
End: 2021-01-05
Attending: INTERNAL MEDICINE
Payer: COMMERCIAL

## 2021-01-05 ENCOUNTER — OFFICE VISIT (OUTPATIENT)
Dept: INTERNAL MEDICINE CLINIC | Facility: CLINIC | Age: 71
End: 2021-01-05
Payer: COMMERCIAL

## 2021-01-05 VITALS
WEIGHT: 220 LBS | OXYGEN SATURATION: 97 % | BODY MASS INDEX: 33.34 KG/M2 | SYSTOLIC BLOOD PRESSURE: 130 MMHG | HEIGHT: 68 IN | TEMPERATURE: 98 F | RESPIRATION RATE: 16 BRPM | HEART RATE: 71 BPM | DIASTOLIC BLOOD PRESSURE: 70 MMHG

## 2021-01-05 DIAGNOSIS — Z13.89 SCREENING FOR GENITOURINARY CONDITION: ICD-10-CM

## 2021-01-05 DIAGNOSIS — Z12.5 PROSTATE CANCER SCREENING: ICD-10-CM

## 2021-01-05 DIAGNOSIS — Z13.29 THYROID DISORDER SCREEN: ICD-10-CM

## 2021-01-05 DIAGNOSIS — Z00.00 ANNUAL PHYSICAL EXAM: Primary | ICD-10-CM

## 2021-01-05 DIAGNOSIS — Z13.0 SCREENING, IRON DEFICIENCY ANEMIA: ICD-10-CM

## 2021-01-05 DIAGNOSIS — Z00.00 LABORATORY EXAMINATION ORDERED AS PART OF A ROUTINE GENERAL MEDICAL EXAMINATION: ICD-10-CM

## 2021-01-05 DIAGNOSIS — Z13.220 LIPID SCREENING: ICD-10-CM

## 2021-01-05 LAB
ALBUMIN SERPL-MCNC: 3.7 G/DL (ref 3.4–5)
ALBUMIN/GLOB SERPL: 0.9 {RATIO} (ref 1–2)
ALP LIVER SERPL-CCNC: 119 U/L
ALT SERPL-CCNC: 46 U/L
ANION GAP SERPL CALC-SCNC: 9 MMOL/L (ref 0–18)
AST SERPL-CCNC: 25 U/L (ref 15–37)
BASOPHILS # BLD AUTO: 0.02 X10(3) UL (ref 0–0.2)
BASOPHILS NFR BLD AUTO: 0.2 %
BILIRUB SERPL-MCNC: 0.5 MG/DL (ref 0.1–2)
BILIRUB UR QL STRIP.AUTO: NEGATIVE
BUN BLD-MCNC: 16 MG/DL (ref 7–18)
BUN/CREAT SERPL: 12.9 (ref 10–20)
CALCIUM BLD-MCNC: 9.3 MG/DL (ref 8.5–10.1)
CHLORIDE SERPL-SCNC: 106 MMOL/L (ref 98–112)
CHOLEST SMN-MCNC: 201 MG/DL (ref ?–200)
CO2 SERPL-SCNC: 23 MMOL/L (ref 21–32)
COLOR UR AUTO: YELLOW
COMPLEXED PSA SERPL-MCNC: 2.71 NG/ML (ref ?–4)
CREAT BLD-MCNC: 1.24 MG/DL
DEPRECATED RDW RBC AUTO: 51.2 FL (ref 35.1–46.3)
EOSINOPHIL # BLD AUTO: 0.17 X10(3) UL (ref 0–0.7)
EOSINOPHIL NFR BLD AUTO: 2.1 %
ERYTHROCYTE [DISTWIDTH] IN BLOOD BY AUTOMATED COUNT: 14.9 % (ref 11–15)
EST. AVERAGE GLUCOSE BLD GHB EST-MCNC: 128 MG/DL (ref 68–126)
GLOBULIN PLAS-MCNC: 4.2 G/DL (ref 2.8–4.4)
GLUCOSE BLD-MCNC: 103 MG/DL (ref 70–99)
GLUCOSE UR STRIP.AUTO-MCNC: NEGATIVE MG/DL
HBA1C MFR BLD HPLC: 6.1 % (ref ?–5.7)
HCT VFR BLD AUTO: 44.7 %
HDLC SERPL-MCNC: 67 MG/DL (ref 40–59)
HGB BLD-MCNC: 14.8 G/DL
IMM GRANULOCYTES # BLD AUTO: 0.03 X10(3) UL (ref 0–1)
IMM GRANULOCYTES NFR BLD: 0.4 %
KETONES UR STRIP.AUTO-MCNC: NEGATIVE MG/DL
LDLC SERPL CALC-MCNC: 119 MG/DL (ref ?–100)
LEUKOCYTE ESTERASE UR QL STRIP.AUTO: NEGATIVE
LYMPHOCYTES # BLD AUTO: 1.98 X10(3) UL (ref 1–4)
LYMPHOCYTES NFR BLD AUTO: 24.4 %
M PROTEIN MFR SERPL ELPH: 7.9 G/DL (ref 6.4–8.2)
MCH RBC QN AUTO: 31 PG (ref 26–34)
MCHC RBC AUTO-ENTMCNC: 33.1 G/DL (ref 31–37)
MCV RBC AUTO: 93.7 FL
MONOCYTES # BLD AUTO: 1.25 X10(3) UL (ref 0.1–1)
MONOCYTES NFR BLD AUTO: 15.4 %
NEUTROPHILS # BLD AUTO: 4.68 X10 (3) UL (ref 1.5–7.7)
NEUTROPHILS # BLD AUTO: 4.68 X10(3) UL (ref 1.5–7.7)
NEUTROPHILS NFR BLD AUTO: 57.5 %
NITRITE UR QL STRIP.AUTO: NEGATIVE
NONHDLC SERPL-MCNC: 134 MG/DL (ref ?–130)
OSMOLALITY SERPL CALC.SUM OF ELEC: 287 MOSM/KG (ref 275–295)
PATIENT FASTING Y/N/NP: YES
PATIENT FASTING Y/N/NP: YES
PH UR STRIP.AUTO: 5 [PH] (ref 4.5–8)
PLATELET # BLD AUTO: 251 10(3)UL (ref 150–450)
POTASSIUM SERPL-SCNC: 4.7 MMOL/L (ref 3.5–5.1)
PROT UR STRIP.AUTO-MCNC: NEGATIVE MG/DL
RBC # BLD AUTO: 4.77 X10(6)UL
RBC UR QL AUTO: NEGATIVE
SODIUM SERPL-SCNC: 138 MMOL/L (ref 136–145)
SP GR UR STRIP.AUTO: 1.02 (ref 1–1.03)
TRIGL SERPL-MCNC: 73 MG/DL (ref 30–149)
TSI SER-ACNC: 3.13 MIU/ML (ref 0.36–3.74)
UROBILINOGEN UR STRIP.AUTO-MCNC: <2 MG/DL
VLDLC SERPL CALC-MCNC: 15 MG/DL (ref 0–30)
WBC # BLD AUTO: 8.1 X10(3) UL (ref 4–11)

## 2021-01-05 PROCEDURE — 84443 ASSAY THYROID STIM HORMONE: CPT

## 2021-01-05 PROCEDURE — 36415 COLL VENOUS BLD VENIPUNCTURE: CPT

## 2021-01-05 PROCEDURE — 3078F DIAST BP <80 MM HG: CPT | Performed by: INTERNAL MEDICINE

## 2021-01-05 PROCEDURE — 99397 PER PM REEVAL EST PAT 65+ YR: CPT | Performed by: INTERNAL MEDICINE

## 2021-01-05 PROCEDURE — 3075F SYST BP GE 130 - 139MM HG: CPT | Performed by: INTERNAL MEDICINE

## 2021-01-05 PROCEDURE — 3008F BODY MASS INDEX DOCD: CPT | Performed by: INTERNAL MEDICINE

## 2021-01-05 PROCEDURE — 80053 COMPREHEN METABOLIC PANEL: CPT

## 2021-01-05 PROCEDURE — 83036 HEMOGLOBIN GLYCOSYLATED A1C: CPT

## 2021-01-05 PROCEDURE — 80061 LIPID PANEL: CPT

## 2021-01-05 PROCEDURE — 85025 COMPLETE CBC W/AUTO DIFF WBC: CPT

## 2021-01-05 PROCEDURE — 81003 URINALYSIS AUTO W/O SCOPE: CPT

## 2021-01-05 NOTE — PROGRESS NOTES
HPI:    Patient ID: Rachid Ren is a 65year old male. HPI  Rachid Ren is a 65year old male who presents for a complete physical exam.   HPI:   Pt complains of nothing today.  Denies cp or sob    PAST MEDICAL, SOCIAL, FAMILY HISTORIES REVIEWE 39 01/03/2020    ALT 32 01/07/2019    ALT 46 06/28/2018     Lab Results   Component Value Date    PSA 2.57 12/18/2017    PSA 2.530 12/14/2016        Current Outpatient Medications   Medication Sig Dispense Refill   • losartan Potassium 50 MG Oral Tab apparent distress  SKIN: no rashes,no suspicious lesions  HEENT: atraumatic, normocephalic,ears and throat are clear  EYES:PERRLA, EOMI, normal optic disk,conjunctiva are clear  NECK: supple,no adenopathy,no bruits  LUNGS: clear to auscultation  CARDIO: RR COMP METABOLIC PANEL      HGB K6E      LIPID PANEL      TSH W REFLEX TO FREE T4      URINALYSIS, ROUTINE      PSA (Screening) [E]      Meds This Visit:  Requested Prescriptions      No prescriptions requested or ordered in this encounter       Imaging & Re

## 2021-03-04 DIAGNOSIS — Z23 NEED FOR VACCINATION: ICD-10-CM

## 2021-07-06 ENCOUNTER — OFFICE VISIT (OUTPATIENT)
Dept: INTERNAL MEDICINE CLINIC | Facility: CLINIC | Age: 71
End: 2021-07-06
Payer: COMMERCIAL

## 2021-07-06 VITALS
RESPIRATION RATE: 16 BRPM | TEMPERATURE: 98 F | SYSTOLIC BLOOD PRESSURE: 128 MMHG | WEIGHT: 213 LBS | HEIGHT: 68 IN | DIASTOLIC BLOOD PRESSURE: 82 MMHG | BODY MASS INDEX: 32.28 KG/M2 | HEART RATE: 83 BPM | OXYGEN SATURATION: 97 %

## 2021-07-06 DIAGNOSIS — Z12.11 COLON CANCER SCREENING: ICD-10-CM

## 2021-07-06 DIAGNOSIS — I10 ESSENTIAL HYPERTENSION: Primary | ICD-10-CM

## 2021-07-06 PROCEDURE — 3008F BODY MASS INDEX DOCD: CPT | Performed by: INTERNAL MEDICINE

## 2021-07-06 PROCEDURE — 99213 OFFICE O/P EST LOW 20 MIN: CPT | Performed by: INTERNAL MEDICINE

## 2021-07-06 PROCEDURE — 3079F DIAST BP 80-89 MM HG: CPT | Performed by: INTERNAL MEDICINE

## 2021-07-06 PROCEDURE — 3074F SYST BP LT 130 MM HG: CPT | Performed by: INTERNAL MEDICINE

## 2021-07-06 NOTE — PROGRESS NOTES
HPI/Subjective:   Patient ID: Veda Willis is a 66year old male. Hypertension      Veda Willis is a 66year old male. HPI:   Patient presents for recheck of his hypertension.  Pt has been taking medications as instructed, no medication side Smoking status: Former Smoker        Quit date: 10/30/2013        Years since quittin.6      Smokeless tobacco: Never Used    Vaping Use      Vaping Use: Never used    Alcohol use: No      Comment: quit    Drug use: No        REVIEW OF SYSTEMS:   GEN screening    Orders Placed This Encounter      Occult Blood, Fecal, Immunoassay (Blue cards) [E]      Meds This Visit:  Requested Prescriptions      No prescriptions requested or ordered in this encounter       Imaging & Referrals:  None

## 2021-09-28 ENCOUNTER — LAB ENCOUNTER (OUTPATIENT)
Dept: LAB | Facility: HOSPITAL | Age: 71
End: 2021-09-28
Attending: INTERNAL MEDICINE
Payer: COMMERCIAL

## 2021-09-28 DIAGNOSIS — Z12.11 COLON CANCER SCREENING: ICD-10-CM

## 2021-09-28 PROCEDURE — 82274 ASSAY TEST FOR BLOOD FECAL: CPT

## 2022-01-06 ENCOUNTER — LAB ENCOUNTER (OUTPATIENT)
Dept: LAB | Age: 72
End: 2022-01-06
Attending: INTERNAL MEDICINE
Payer: COMMERCIAL

## 2022-01-06 ENCOUNTER — OFFICE VISIT (OUTPATIENT)
Dept: INTERNAL MEDICINE CLINIC | Facility: CLINIC | Age: 72
End: 2022-01-06
Payer: COMMERCIAL

## 2022-01-06 VITALS
OXYGEN SATURATION: 97 % | SYSTOLIC BLOOD PRESSURE: 124 MMHG | RESPIRATION RATE: 12 BRPM | HEIGHT: 68 IN | DIASTOLIC BLOOD PRESSURE: 72 MMHG | WEIGHT: 201 LBS | BODY MASS INDEX: 30.46 KG/M2 | HEART RATE: 64 BPM | TEMPERATURE: 98 F

## 2022-01-06 DIAGNOSIS — Z12.5 PROSTATE CANCER SCREENING: ICD-10-CM

## 2022-01-06 DIAGNOSIS — E78.2 MIXED HYPERLIPIDEMIA: ICD-10-CM

## 2022-01-06 DIAGNOSIS — Z00.00 ENCOUNTER FOR ANNUAL HEALTH EXAMINATION: ICD-10-CM

## 2022-01-06 DIAGNOSIS — I10 ESSENTIAL HYPERTENSION: ICD-10-CM

## 2022-01-06 DIAGNOSIS — Z00.00 ANNUAL PHYSICAL EXAM: Primary | ICD-10-CM

## 2022-01-06 DIAGNOSIS — I25.10 CAD IN NATIVE ARTERY: ICD-10-CM

## 2022-01-06 LAB
ALBUMIN SERPL-MCNC: 4.3 G/DL (ref 3.4–5)
ALBUMIN/GLOB SERPL: 1.3 {RATIO} (ref 1–2)
ALP LIVER SERPL-CCNC: 119 U/L
ALT SERPL-CCNC: 33 U/L
ANION GAP SERPL CALC-SCNC: 7 MMOL/L (ref 0–18)
AST SERPL-CCNC: 25 U/L (ref 15–37)
BASOPHILS # BLD AUTO: 0.01 X10(3) UL (ref 0–0.2)
BASOPHILS NFR BLD AUTO: 0.1 %
BILIRUB SERPL-MCNC: 0.8 MG/DL (ref 0.1–2)
BILIRUB UR QL STRIP.AUTO: NEGATIVE
BUN BLD-MCNC: 20 MG/DL (ref 7–18)
CALCIUM BLD-MCNC: 9.8 MG/DL (ref 8.5–10.1)
CHLORIDE SERPL-SCNC: 106 MMOL/L (ref 98–112)
CHOLEST SERPL-MCNC: 195 MG/DL (ref ?–200)
CO2 SERPL-SCNC: 26 MMOL/L (ref 21–32)
COLOR UR AUTO: YELLOW
COMPLEXED PSA SERPL-MCNC: 2.88 NG/ML (ref ?–4)
CREAT BLD-MCNC: 1.14 MG/DL
EOSINOPHIL # BLD AUTO: 0.15 X10(3) UL (ref 0–0.7)
EOSINOPHIL NFR BLD AUTO: 1.9 %
ERYTHROCYTE [DISTWIDTH] IN BLOOD BY AUTOMATED COUNT: 14.6 %
FASTING PATIENT LIPID ANSWER: YES
FASTING STATUS PATIENT QL REPORTED: YES
GLOBULIN PLAS-MCNC: 3.4 G/DL (ref 2.8–4.4)
GLUCOSE BLD-MCNC: 99 MG/DL (ref 70–99)
GLUCOSE UR STRIP.AUTO-MCNC: NEGATIVE MG/DL
HCT VFR BLD AUTO: 45.2 %
HDLC SERPL-MCNC: 60 MG/DL (ref 40–59)
HGB BLD-MCNC: 14.8 G/DL
IMM GRANULOCYTES # BLD AUTO: 0.03 X10(3) UL (ref 0–1)
IMM GRANULOCYTES NFR BLD: 0.4 %
LDLC SERPL CALC-MCNC: 119 MG/DL (ref ?–100)
LEUKOCYTE ESTERASE UR QL STRIP.AUTO: NEGATIVE
LYMPHOCYTES # BLD AUTO: 1.72 X10(3) UL (ref 1–4)
LYMPHOCYTES NFR BLD AUTO: 21.4 %
MCH RBC QN AUTO: 30.8 PG (ref 26–34)
MCHC RBC AUTO-ENTMCNC: 32.7 G/DL (ref 31–37)
MCV RBC AUTO: 94.2 FL
MONOCYTES # BLD AUTO: 1.01 X10(3) UL (ref 0.1–1)
MONOCYTES NFR BLD AUTO: 12.5 %
NEUTROPHILS # BLD AUTO: 5.13 X10 (3) UL (ref 1.5–7.7)
NEUTROPHILS # BLD AUTO: 5.13 X10(3) UL (ref 1.5–7.7)
NEUTROPHILS NFR BLD AUTO: 63.7 %
NITRITE UR QL STRIP.AUTO: NEGATIVE
NONHDLC SERPL-MCNC: 135 MG/DL (ref ?–130)
OSMOLALITY SERPL CALC.SUM OF ELEC: 291 MOSM/KG (ref 275–295)
PH UR STRIP.AUTO: 5 [PH] (ref 5–8)
PLATELET # BLD AUTO: 246 10(3)UL (ref 150–450)
POTASSIUM SERPL-SCNC: 4.6 MMOL/L (ref 3.5–5.1)
PROT SERPL-MCNC: 7.7 G/DL (ref 6.4–8.2)
PROT UR STRIP.AUTO-MCNC: NEGATIVE MG/DL
RBC # BLD AUTO: 4.8 X10(6)UL
RBC UR QL AUTO: NEGATIVE
SODIUM SERPL-SCNC: 139 MMOL/L (ref 136–145)
SP GR UR STRIP.AUTO: 1.02 (ref 1–1.03)
TRIGL SERPL-MCNC: 90 MG/DL (ref 30–149)
UROBILINOGEN UR STRIP.AUTO-MCNC: <2 MG/DL
VLDLC SERPL CALC-MCNC: 16 MG/DL (ref 0–30)
WBC # BLD AUTO: 8.1 X10(3) UL (ref 4–11)

## 2022-01-06 PROCEDURE — 84153 ASSAY OF PSA TOTAL: CPT | Performed by: INTERNAL MEDICINE

## 2022-01-06 PROCEDURE — 81003 URINALYSIS AUTO W/O SCOPE: CPT | Performed by: INTERNAL MEDICINE

## 2022-01-06 PROCEDURE — 80061 LIPID PANEL: CPT | Performed by: INTERNAL MEDICINE

## 2022-01-06 PROCEDURE — 3008F BODY MASS INDEX DOCD: CPT | Performed by: INTERNAL MEDICINE

## 2022-01-06 PROCEDURE — 85025 COMPLETE CBC W/AUTO DIFF WBC: CPT | Performed by: INTERNAL MEDICINE

## 2022-01-06 PROCEDURE — 3078F DIAST BP <80 MM HG: CPT | Performed by: INTERNAL MEDICINE

## 2022-01-06 PROCEDURE — 99397 PER PM REEVAL EST PAT 65+ YR: CPT | Performed by: INTERNAL MEDICINE

## 2022-01-06 PROCEDURE — 80053 COMPREHEN METABOLIC PANEL: CPT | Performed by: INTERNAL MEDICINE

## 2022-01-06 PROCEDURE — 3074F SYST BP LT 130 MM HG: CPT | Performed by: INTERNAL MEDICINE

## 2022-01-06 NOTE — PROGRESS NOTES
HPI:   Hieu Henyr is a 66year old male who presents for a Medicare Subsequent Annual Wellness visit (Pt already had Initial Annual Wellness).     HPI:  Here for AWV  No complaints    No issues    PAST MEDICAL, SOCIAL, FAMILY HISTORIES REVIEWED WITH P Patient Care Team: Patient Care Team:  Yolette Spring MD as PCP - General (Internal Medicine)    Patient Active Problem List:     CAD in native artery 2013 s/p MI 2013 Promus Elemant Plus stent LAD (distal) Dr. Manda Dunbar     Mixed hyperlipidemia     Essential Ht 5' 8\" (1.727 m)   Wt 201 lb (91.2 kg)   SpO2 97%   BMI 30.56 kg/m²   Estimated body mass index is 30.56 kg/m² as calculated from the following:    Height as of this encounter: 5' 8\" (1.727 m). Weight as of this encounter: 201 lb (91.2 kg).     Medi Assessment.      PLAN SUMMARY:   Diagnoses and all orders for this visit:    Annual physical exam    CAD in native artery 2013 s/p MI 2013 Promus Elemant Plus stent LAD (distal) Dr. Cassy Mathis    Essential hypertension    Mixed hyperlipidemia         Diet assessm Screening for Abdominal Aortic Aneurysm (AAA) Covered once in a lifetime for one of the following risk factors   • Men who are 73-68 years old and have ever smoked   • Anyone with a family history -     Colorectal Cancer Screening  Covered for ages 52-80;

## 2022-01-06 NOTE — PATIENT INSTRUCTIONS
Dustin Silva's SCREENING SCHEDULE   Tests on this list are recommended by your physician but may not be covered, or covered at this frequency, by your insurer. Please check with your insurance carrier before scheduling to verify coverage.    PREVENT Pneumococcal Each vaccine ([id number] & [id number]) covered once after 65 Prevnar 13: 11/13/2015    [id number]: 10/19/2013     No recommendations at this time    Hepatitis B One screening covered for patients with certain risk factors   -  No recommendati

## 2022-03-02 ENCOUNTER — TELEPHONE (OUTPATIENT)
Dept: INTERNAL MEDICINE CLINIC | Facility: CLINIC | Age: 72
End: 2022-03-02

## 2022-03-02 NOTE — TELEPHONE ENCOUNTER
Mail received from [place] [id number] requesting medical records from June 13, 2019- September 28, 2021. Sent to [facility] medical records department.

## 2022-07-07 ENCOUNTER — OFFICE VISIT (OUTPATIENT)
Dept: INTERNAL MEDICINE CLINIC | Facility: CLINIC | Age: 72
End: 2022-07-07
Payer: COMMERCIAL

## 2022-07-07 VITALS
DIASTOLIC BLOOD PRESSURE: 82 MMHG | HEIGHT: 68 IN | RESPIRATION RATE: 16 BRPM | BODY MASS INDEX: 32.13 KG/M2 | TEMPERATURE: 98 F | OXYGEN SATURATION: 98 % | WEIGHT: 212 LBS | HEART RATE: 78 BPM | SYSTOLIC BLOOD PRESSURE: 130 MMHG

## 2022-07-07 DIAGNOSIS — Z12.11 COLON CANCER SCREENING: ICD-10-CM

## 2022-07-07 DIAGNOSIS — G89.29 CHRONIC PAIN OF RIGHT KNEE: ICD-10-CM

## 2022-07-07 DIAGNOSIS — I25.10 CAD IN NATIVE ARTERY: ICD-10-CM

## 2022-07-07 DIAGNOSIS — E78.2 MIXED HYPERLIPIDEMIA: ICD-10-CM

## 2022-07-07 DIAGNOSIS — I10 ESSENTIAL HYPERTENSION: Primary | ICD-10-CM

## 2022-07-07 DIAGNOSIS — M25.561 CHRONIC PAIN OF RIGHT KNEE: ICD-10-CM

## 2022-07-07 PROCEDURE — 99214 OFFICE O/P EST MOD 30 MIN: CPT | Performed by: INTERNAL MEDICINE

## 2022-07-07 PROCEDURE — 3079F DIAST BP 80-89 MM HG: CPT | Performed by: INTERNAL MEDICINE

## 2022-07-07 PROCEDURE — 3075F SYST BP GE 130 - 139MM HG: CPT | Performed by: INTERNAL MEDICINE

## 2022-07-07 PROCEDURE — 3008F BODY MASS INDEX DOCD: CPT | Performed by: INTERNAL MEDICINE

## 2022-07-07 RX ORDER — EZETIMIBE 10 MG/1
10 TABLET ORAL DAILY
COMMUNITY
Start: 2022-06-14

## 2022-07-12 ENCOUNTER — TELEPHONE (OUTPATIENT)
Dept: PHYSICAL THERAPY | Facility: HOSPITAL | Age: 72
End: 2022-07-12

## 2022-07-14 ENCOUNTER — TELEPHONE (OUTPATIENT)
Dept: PHYSICAL THERAPY | Facility: HOSPITAL | Age: 72
End: 2022-07-14

## 2022-07-15 ENCOUNTER — APPOINTMENT (OUTPATIENT)
Dept: PHYSICAL THERAPY | Age: 72
End: 2022-07-15
Attending: INTERNAL MEDICINE
Payer: COMMERCIAL

## 2022-07-15 ENCOUNTER — TELEPHONE (OUTPATIENT)
Dept: PHYSICAL THERAPY | Facility: HOSPITAL | Age: 72
End: 2022-07-15

## 2022-07-18 ENCOUNTER — TELEPHONE (OUTPATIENT)
Dept: PHYSICAL THERAPY | Facility: HOSPITAL | Age: 72
End: 2022-07-18

## 2022-07-21 ENCOUNTER — APPOINTMENT (OUTPATIENT)
Dept: PHYSICAL THERAPY | Age: 72
End: 2022-07-21
Attending: INTERNAL MEDICINE
Payer: COMMERCIAL

## 2022-07-28 ENCOUNTER — OFFICE VISIT (OUTPATIENT)
Dept: PHYSICAL THERAPY | Age: 72
End: 2022-07-28
Attending: INTERNAL MEDICINE
Payer: COMMERCIAL

## 2022-07-28 ENCOUNTER — APPOINTMENT (OUTPATIENT)
Dept: PHYSICAL THERAPY | Age: 72
End: 2022-07-28
Attending: INTERNAL MEDICINE
Payer: COMMERCIAL

## 2022-07-28 PROCEDURE — 97140 MANUAL THERAPY 1/> REGIONS: CPT

## 2022-07-28 PROCEDURE — 97110 THERAPEUTIC EXERCISES: CPT

## 2022-07-28 PROCEDURE — 97161 PT EVAL LOW COMPLEX 20 MIN: CPT

## 2022-07-28 NOTE — PATIENT INSTRUCTIONS
Patient was issued a HEP handout from [email or web address]. Exercise selection, recommended resistance, and proper form/technique were reviewed with the patient. Patient verbalized understanding/comprehension of instruction and recommendations. View at [email or web address] using code: [email or web address]    4

## 2022-07-28 NOTE — PROGRESS NOTES
APHYSICAL THERAPY INITIAL EVALUATION     Date of service: 7/28/2022  Dx: Chronic pain of right knee (M25.561,G89.29)     Insurance: [employer]  Insurance Limits: authorization after 12 visits  Visit #: 1  Authorized # of Visits: 12  POC/Auth Expiration: 7/7/23  Authorizing Physician/Provider: Hilton Jerez     PATIENT SUMMARY     History/MARCUS: The patient reports that he is with medial knee pain that has been going on for a few month. The patient reports that he fell in the garden last week which has caused some pain on the top of the knee. The patient reports that he had a regular appt with his PCP and mentioned it to the doctor. \"He thought it might have been a ligament. \" The patient reports that a year ago he injured his knee bowling that caused some severe pain in his back. \"I think I irritated a nerve. I don't know if that has anything to do with it. \"     DOI/S: chronic, insidious onset    Aggravating Factors: walking, standing,     Alleviating Factors: Tylenol taken as needed, usually at night. PMH: The patient's PMH was reviewed with the patient including allergies, medications, and surgical and medical history. No other previous knee injuries. PLF/Personal Goals: \"Just get rid of the pain. I do go to the gym everyday to workout. \" Patient has a stent in his heart. Treadmill, stair stepping, strength machines. The patient reports that he is still able to do that. Occupation: . \"After I stand for a half hour, it starts to hurt. OBJECTIVE:     Pain/Symptom Presentation: Patient reports joint line pain over the medial right knee. Pain at rest: 0/10  Pain at worst: 6/10    Outcomes Measure(s): FOTO: TBA - patient account wasn't set up at time of initial evaluation    Activity Measures:  Sleeping: No Activity Limitation: Patient is without limitations for sleeping. Sitting: No Activity Limitation: Patient is able to sit without limitations.   Standing/Walking After Prolonged Sitting: Mild Activity Limitation: Patient reports stiffness subsides in 5-10 minutes. Standing: Mild Activity Limitation: Patient is able to stand up to 30 minutes before disruption due to symptoms. Walking: Moderate Activity Limitation: Patient is able to walk 1 mile before disruption due to pain. Driving: No Activity Limitation: Patient is without limitations for driving. Ascending Stairs: Mild Activity Limitation: Patient navigates up the stairs with reciprocal gait pattern, mild deviations. Descending Stairs: Moderate Activity Limitation: Patient navigates down the stairs with step-to gait pattern. Stairs: Mild Activity Limitation: Patient is able to navigate up to 3 flights of stairs at a time. Inspection/Observation: Patient demonstrates no apparent bruising, swelling, or deformity of the right knee. Gait: Patient demonstrates mildly antalgic gait pattern upon standing after   Palpation: Patient demonstrates TTP along the right medial knee joint line.      ROM:   Knee Motion PROM AROM    Right Left Right Left   Knee Extension 0 0 0 0   Knee Flexion 130* 135 N/A N/A   *indicates activity was associated with pain    Strength/MMT:   Knee Motion Strength    Right Left   Knee Extension 4/5 5/5   Knee Flexion 4/5 5/5   *indicates activity was associated with pain    Hip Motion Strength    Right Left   Hip Flexion 5/5 5/5   Hip Extension 4/5 4/5   Hip ABD 4/5 4/5   *indicates activity was associated with pain    Special Tests:   Knee Special Tests:   Patellofemoral:   Patellar Apprehension: (R) (-), (L) (-)  Patellar Compression: (R) (+), (L) (-)  Maier's: (R) (+), (L) (-)  Ligamentous:  Varus Stress Test: (R) (-), (L) (-)  Valgus Stress Test: (R) (-), (L) (-)  Anterior Drawer: (R) (-), (L) (-)  Lachman's: (R) (-), (L) (-)  Posterior Drawer: (R) (-), (L) (-)  Meniscal:   Joint Line Tenderness: (+)  Pain with passive end range flexion: (+)  Pain with passive end range extension: (-)   Kaylee's: (R) (-), (L) (-)    ASSESSMENT:     Abe Staton is a 74year old male that presents to physical therapy evaluation for right medial knee pain without known incident. The patient demonstrates TTP along the medial knee joint line and pain with end-range flexion. The patient is with some pain to patellar compression but isn't TTP along PFJ line. Provocative testing does not suggest any ligamentous involvement. The patient reports some clicking in his knee and also notices stiffness upon standing/walking after prolonged sitting. He also reports increased pain with walking long distance and prolonged standing, which is required for his job as a . He is also with limitations for navigating stairs, more difficulty with descending stairs. The patient's presentation may suggest some degradation of the medial compartment of the knee with or without meniscal involvement. The patient was educated on the rationale and purpose of physical therapy to facilitate improved strength and support for the knee joint that hopefully will reduce his symptoms and improve his function. The patient would benefit from physical therapy to facilitate improved knee stability and LE strength to facilitate improved tolerance to ADL's and occupational activities. Precautions/WB Status: WBAT  Education or Treatment Limitation(s): None  Rehab Potential: Good    TREATMENT:     Initial Evaluation: x 15min     Therapeutic Exercise: x 10min  DL bridge: 1 x 10   S/L clamshell: 2 x 10 (B), red theraband  S/L hip ABD: 2 x 10 (B)  Standing hip ABD and ext: 2 x 10 (B)   Patient Education: Patient was educated on anatomy and pathophysiology of current condition, rationale for physical therapy, anticipated treatment interventions, prognosis, timeline for recovery, and expected functional outcomes based on evaluative findings. Patient was educated on the importance of compliance with consistent treatment and HEP to achieve mutually established goals.  All patient's questions were answered and the patient denied further comments, complaints, or concerns upon departure. Administered HEP: Reviewed HEP handout, exercise selection, and recommended resistance. Provided verbal and written instructions/cueing for proper technique and common errors/compensations as needed. Neuromuscular Re-education: x 5min  Medial patellar joint mobs: Grade 3, 4 x 10\" (R)  Inf patellar joint mobs: Grade 3, 4 x 10\" (R)   Hooklying PPT: 2 x 10 x 5\"     Manual Therapy: x 10min  Soft tissue mobilization: (R) knee    Home Exercise Program: Patient was issued a HEP handout 7/28/2022 including DL bridging, S/L clamshell, S/L hip ABD, hooklying PPT, and standing hip ABD and ext. Charges: PT Eval Low Complexity Complexity x 1, Therex x 1, MT x 1  Total Timed Treatment: 25min       Total Treatment Time: 40min     PLAN OF CARE:      Goals:  Short-Term Goals:  1. Patient will improve knee flexion AROM to 135deg pain-free on right LE to normalize ROM to functional range for household and community ambulation. Timeframe: 2-3 weeks. 2. Patient will improve knee mobility to facilitate erect standing/walking after prolonged sitting without complaints of stiffness. Timeframe: 2-3 weeks. Long-Term Goals:  1. The patient will improve LE strength and endurance to facilitate regular standing for extended periods of time for 4-6 hours daily for occupational activities. Timeframe: 6-8 weeks. 2. The patient will improve LE strength and endurance to facilitate walking distances of 2 mile(s) daily for community integration and occupational activities. Timeframe: 6-8 weeks. 3. Patient will improve LE mobility, strength, and single-leg stabilization to meet strength requirements for reciprocal stair navigation pattern with no asymmetries for ascending and descending 5 flights of stairs daily for household and community ambulation. Timeframe: 6-8 weeks.     Plan Frequency / Duration: Patient will be seen for 2x/week for 6 weeks, for a total of 12 visits, over a 90 day period. We will re-evaluate the patient at that time in order to determine functional progress, evaluate short-term goal completion, and establish an updated plan of care. Possible treatment interventions will/may include: Therapeutic Activities, Therapeutic Exercise, Neuromuscular Re-education, Manual Therapy, Home Exercise Program Instruction, Patient Education, Self-Care/Home Management, Gait Training, and Modalities as needed. Patient/Family was advised of these findings, precautions, and treatment options and has agreed to actively participate in planning and for this course of care. Thank you for your referral. Please co-sign or sign and return this letter via fax as soon as possible to [phone]. If you have any questions, please contact me at Dept: [phone]. Sincerely,    X___Aileen Garcia, PT, DPT, SCS, ATC, CSCS____ Date: __7/28/2022__________    Electronically signed by therapist: Marina Lagos PT  [de-identified] certification required: Yes  I certify the need for these services furnished under this plan of treatment and while under my care.

## 2022-08-03 ENCOUNTER — OFFICE VISIT (OUTPATIENT)
Dept: PHYSICAL THERAPY | Age: 72
End: 2022-08-03
Attending: INTERNAL MEDICINE
Payer: COMMERCIAL

## 2022-08-03 DIAGNOSIS — G89.29 CHRONIC PAIN OF RIGHT KNEE: ICD-10-CM

## 2022-08-03 DIAGNOSIS — M25.561 CHRONIC PAIN OF RIGHT KNEE: ICD-10-CM

## 2022-08-03 PROCEDURE — 97112 NEUROMUSCULAR REEDUCATION: CPT

## 2022-08-03 PROCEDURE — 97140 MANUAL THERAPY 1/> REGIONS: CPT

## 2022-08-03 PROCEDURE — 97110 THERAPEUTIC EXERCISES: CPT

## 2022-08-03 NOTE — PROGRESS NOTES
Date of Service: 8/3/2022  Dx: Chronic pain of right knee (M25.561,G89.29)              Insurance: [employer] PPO  Insurance Limits: authorization after 12 visits  Visit #: 2  Authorized # of Visits: 12  POC/Auth Expiration: 7/7/23  Date of Last PN: 7/28/22 (Visit #1)  Authorizing Physician/Provider: Yelitza Wyman MD visit: N/A  Fall Risk: Standard         Precautions: N/A            Subjective: \"It's about the same. \" The patient reports that he felt okay after his last appt. The patient reports no issues or questions about his HEP. Pain/Symptom Presentation: Patient reports medial-sided knee pain    Pain at rest: 0/10  Pain at worst: 4-5/10    Objective:     Outcomes Measure(s): FOTO: TBA - patient account wasn't set up at time of initial evaluation    ROM:   Knee Motion PROM AROM    Right Left Right Left   Knee Extension 0 0 0 0   Knee Flexion 130* 135 N/A N/A   *indicates activity was associated with pain    Strength/MMT:   Knee Motion Strength    Right Left   Knee Extension 4/5 5/5   Knee Flexion 4/5 5/5   *indicates activity was associated with pain    Hip Motion Strength    Right Left   Hip Flexion 5/5 5/5   Hip Extension 4/5 4/5   Hip ABD 4/5 4/5   *indicates activity was associated with pain    Treatment:  Therapeutic Activities: x 7min  Shuttle DLP: x 20, 5 cords  Shuttle SLP: x 20 (B), 3 cords   Standing split squat: 1 x 10 (B)     Therapeutic Exercise: x 15min  Manual hamstring stretch: x 30\" (B)   SLR: 2 x 10 (R), 2# ankle weight  DL bridge: 2 x 10   S/L clamshell: 2 x 10 (B), red theraband  S/L hip ABD: 2 x 10 (B)  Standing hip ABD and ext: 2 x 10 (B), 2# ankle weight    Neuromuscular Re-education: x 15min  Medial patellar joint mobs: Grade 3, 4 x 10\" (R)  Inf patellar joint mobs: Grade 3, 4 x 10\" (R)   Hooklying PPT: 2 x 10 x 5\"   Dead bug uvaldo SB squeeze: 1 x 10 x 5\" (B)   Pallof press: 2 x 10 (B), black power band, cues for posterior chain loading.    SLB: 2 x 20\" (B)     Manual Therapy: x 8min  Soft tissue mobilization: (R) knee    Provider Interactions With Patient:   Added therapeutic exercises as documented, with cueing provided throughout performance to ensure correct technique during exercise. Assessment: Dustin notes that he is with stiffness complaint with too little activity as well as increased pain with too much activity, further indicating an arthritic condition in his knee. The patient remains compliant with his HEP. The patient participated well in his therapy exercises this date, including exercise progressions. It was noted during pallof press exercise that the patient tends to favor a quad dominant knee loading pattern, requiring cues for posterior chain loading. The patient would benefit from continued therapy for further progression in posterior chain strength and dynamic knee stability. Have patient complete FOTO next session. Goals:   Short-Term Goals:  1. Patient will improve knee flexion AROM to 135deg pain-free on right LE to normalize ROM to functional range for household and community ambulation. Timeframe: 2-3 weeks. 2. Patient will improve knee mobility to facilitate erect standing/walking after prolonged sitting without complaints of stiffness. Timeframe: 2-3 weeks. Long-Term Goals:  1. The patient will improve LE strength and endurance to facilitate regular standing for extended periods of time for 4-6 hours daily for occupational activities. Timeframe: 6-8 weeks. 2. The patient will improve LE strength and endurance to facilitate walking distances of 2 mile(s) daily for community integration and occupational activities. Timeframe: 6-8 weeks. 3. Patient will improve LE mobility, strength, and single-leg stabilization to meet strength requirements for reciprocal stair navigation pattern with no asymmetries for ascending and descending 5 flights of stairs daily for household and community ambulation. Timeframe: 6-8 weeks.     Plan: Continue to progress LE strengthening as tolerated. HEP: Patient was issued a HEP handout 7/28/2022 including DL bridging, S/L clamshell, S/L hip ABD, hooklying PPT, and standing hip ABD and ext. Charges:  Therex x 1, MT x 1, NMR x 1          Total Timed Treatment: 45min    Total Treatment Time: 45min

## 2022-08-05 ENCOUNTER — OFFICE VISIT (OUTPATIENT)
Dept: PHYSICAL THERAPY | Age: 72
End: 2022-08-05
Attending: INTERNAL MEDICINE
Payer: COMMERCIAL

## 2022-08-05 DIAGNOSIS — G89.29 CHRONIC PAIN OF RIGHT KNEE: ICD-10-CM

## 2022-08-05 DIAGNOSIS — M25.561 CHRONIC PAIN OF RIGHT KNEE: ICD-10-CM

## 2022-08-05 PROCEDURE — 97140 MANUAL THERAPY 1/> REGIONS: CPT

## 2022-08-05 PROCEDURE — 97110 THERAPEUTIC EXERCISES: CPT

## 2022-08-05 PROCEDURE — 97112 NEUROMUSCULAR REEDUCATION: CPT

## 2022-08-05 NOTE — PROGRESS NOTES
Date of Service: 8/5/2022  Dx: Chronic pain of right knee (M25.561,G89.29)              Insurance: [employer] PPO  Insurance Limits: authorization after 12 visits  Visit #: 3  Authorized # of Visits: 12  POC/Auth Expiration: 7/7/23  Date of Last PN: 7/28/22 (Visit #1)  Authorizing Physician/Provider: Michelle Wyman MD visit: N/A  Fall Risk: Standard         Precautions: N/A            Subjective: The patient reports that he is now able to go down the stairs without pain. Pain/Symptom Presentation: Patient reports medial-sided knee pain    Pain at rest: 0/10  Pain at worst: 4-5/10    Objective:     Outcomes Measure(s):    FOTO: TBA - patient account wasn't set up at time of initial evaluation    ROM:   Knee Motion PROM AROM    Right Left Right Left   Knee Extension 0 0 0 0   Knee Flexion 130* 135 N/A N/A   *indicates activity was associated with pain    Strength/MMT:   Knee Motion Strength    Right Left   Knee Extension 4/5 5/5   Knee Flexion 4/5 5/5   *indicates activity was associated with pain    Hip Motion Strength    Right Left   Hip Flexion 5/5 5/5   Hip Extension 4/5 4/5   Hip ABD 4/5 4/5   *indicates activity was associated with pain    Treatment:  Therapeutic Activities: x 8min  Shuttle DLP: x 20, 5 cords  Shuttle SLP: x 20 (B), 3 cords   Standing split squat: 1 x 10 (B), cues for technique and posterior chain loading  Lateral lunges: 1 x 10 (B) , cues for posterior chain loading    Therapeutic Exercise: x 15min  Manual hamstring stretch: x 30\" (B)   SLR: 2 x 10 (R), 2# ankle weight  DL bridge: 2 x 10   S/L clamshell: 2 x 10 (B), red theraband  S/L hip ABD: 2 x 10 (R), 2# ankle weight  Standing hip ABD and ext: 2 x 10 (B), 2# ankle weight  Elastic band lateral walk: 2 laps x 20ft, red theraband    Neuromuscular Re-education: x 12min  Medial patellar joint mobs: Grade 3, 4 x 10\" (R)  Inf patellar joint mobs: Grade 3, 4 x 10\" (R)   Dead bug uvaldo SB squeeze: 1 x 10 x 5\" (B)   Pallof press: 2 x 10 (B), black power band, cues for posterior chain loading    Manual Therapy: x 10min  Soft tissue mobilization: (R) knee    Provider Interactions With Patient:   Verbal and manual cueing on proper performance of the prescribed exercises. Assessment: Dustin reports improved tolerance to stair navigation activities. He still requires some cueing for posterior chain loading, but is receptive to feedback and cueing. The patient was able to progress his lunging exercises this date with decent tolerance. We also progress hip ABD strengthening which the patient tolerated well. The patient would benefit from continued therapy for further progression in posterior chain strengthening as well as dynamic knee stability. Goals:   Short-Term Goals:  1. Patient will improve knee flexion AROM to 135deg pain-free on right LE to normalize ROM to functional range for household and community ambulation. Timeframe: 2-3 weeks. 2. Patient will improve knee mobility to facilitate erect standing/walking after prolonged sitting without complaints of stiffness. Timeframe: 2-3 weeks. Long-Term Goals:  1. The patient will improve LE strength and endurance to facilitate regular standing for extended periods of time for 4-6 hours daily for occupational activities. Timeframe: 6-8 weeks. 2. The patient will improve LE strength and endurance to facilitate walking distances of 2 mile(s) daily for community integration and occupational activities. Timeframe: 6-8 weeks. 3. Patient will improve LE mobility, strength, and single-leg stabilization to meet strength requirements for reciprocal stair navigation pattern with no asymmetries for ascending and descending 5 flights of stairs daily for household and community ambulation. Timeframe: 6-8 weeks. Plan: Continue to progress LE strengthening as tolerated.      HEP: Patient was issued a HEP handout 7/28/2022 including DL bridging, S/L clamshell, S/L hip ABD, hooklying PPT, and standing hip ABD and ext. Charges:  Therex x 1, MT x 1, NMR x 1          Total Timed Treatment: 45min    Total Treatment Time: 45min

## 2022-08-12 ENCOUNTER — OFFICE VISIT (OUTPATIENT)
Dept: PHYSICAL THERAPY | Age: 72
End: 2022-08-12
Attending: INTERNAL MEDICINE
Payer: COMMERCIAL

## 2022-08-12 DIAGNOSIS — G89.29 CHRONIC PAIN OF RIGHT KNEE: ICD-10-CM

## 2022-08-12 DIAGNOSIS — M25.561 CHRONIC PAIN OF RIGHT KNEE: ICD-10-CM

## 2022-08-12 PROCEDURE — 97530 THERAPEUTIC ACTIVITIES: CPT

## 2022-08-12 PROCEDURE — 97110 THERAPEUTIC EXERCISES: CPT

## 2022-08-12 PROCEDURE — 97112 NEUROMUSCULAR REEDUCATION: CPT

## 2022-08-12 NOTE — PROGRESS NOTES
Date of Service: 8/12/2022  Dx: Chronic pain of right knee (M25.561,G89.29)              Insurance: [employer] PPO  Insurance Limits: authorization after 12 visits  Visit #: 4  Authorized # of Visits: 12  POC/Auth Expiration: 7/7/23  Date of Last PN: 7/28/22 (Visit #1)  Authorizing Physician/Provider: Devonte Wyman MD visit: N/A  Fall Risk: Standard         Precautions: N/A            Subjective: The patient reports that he was able to stand 3-4 hours at work. He reports some soreness afterward, but only temporary.      Pain/Symptom Presentation: Patient reports medial-sided knee pain    Pain at rest: 0/10  Pain at worst: 4/10    Objective:     ROM:   Knee Motion PROM AROM    Right Left Right Left   Knee Extension 0 0 0 0   Knee Flexion 135 135 N/A N/A   *indicates activity was associated with pain    Strength/MMT:   Knee Motion Strength    Right Left   Knee Extension 4/5 5/5   Knee Flexion 4/5 5/5   *indicates activity was associated with pain    Hip Motion Strength    Right Left   Hip Flexion 5/5 5/5   Hip Extension 4/5 4/5   Hip ABD 4/5 4/5   *indicates activity was associated with pain    Treatment:  Therapeutic Activities: x 10min  Shuttle DLP: x 20, 5 cords  Shuttle SLP: x 20 (B), 3 cords   Standing split squat: 1 x 10 (B), cues for technique and posterior chain loading  Lateral lunges: 1 x 10 (B) , cues for posterior chain loading  Step-up: 1 x 10 (B), 8\" step  Step-down: 1 x 10 (B), 8\" step    Therapeutic Exercise: x 15min  Manual hamstring stretch: x 30\" (B)   SLR: 2 x 10 (R), 2# ankle weight  DL bridge: 2 x 10   S/L clamshell: 2 x 10 (B), red theraband  S/L hip ABD: 2 x 10 (R), 2# ankle weight  Elastic band lateral walk: 2 laps x 20ft, red theraband    Neuromuscular Re-education: x 10min  Medial patellar joint mobs: Grade 3, 4 x 10\" (R)  Inf patellar joint mobs: Grade 3, 4 x 10\" (R)   Dead bug uvaldo SB squeeze: 1 x 10 x 5\" (B)   Pallof press: 2 x 10 (B), black power band    Manual Therapy: x 5min  Soft tissue mobilization: (R) knee    Provider Interactions With Patient:   Verbal and manual cueing on proper performance of the prescribed exercises. Assessment: Fang Souza continues to make improvements, now reporting improved tolerance to prolonged standing. He shows some improvement in posterior chain loading during his exercise performance. We were able to add SL strengthening for stair navigation with good tolerance. We will continue to progress the patient's functional strength as tolerated. Update HEP next session. Goals:   Short-Term Goals:  1. Patient will improve knee flexion AROM to 135deg pain-free on right LE to normalize ROM to functional range for household and community ambulation. Timeframe: 2-3 weeks. (MET 8/12/22)  2. Patient will improve knee mobility to facilitate erect standing/walking after prolonged sitting without complaints of stiffness. Timeframe: 2-3 weeks. (IN PROGRESS 8/12/22)  Long-Term Goals:  1. The patient will improve LE strength and endurance to facilitate regular standing for extended periods of time for 4-6 hours daily for occupational activities. Timeframe: 6-8 weeks. (MET 8/12/22)  2. The patient will improve LE strength and endurance to facilitate walking distances of 2 mile(s) daily for community integration and occupational activities. Timeframe: 6-8 weeks. 3. Patient will improve LE mobility, strength, and single-leg stabilization to meet strength requirements for reciprocal stair navigation pattern with no asymmetries for ascending and descending 5 flights of stairs daily for household and community ambulation. Timeframe: 6-8 weeks. Plan: Continue to progress functional LE strengthening as tolerated. HEP: Patient was issued a HEP handout 7/28/2022 including DL bridging, S/L clamshell, S/L hip ABD, hooklying PPT, and standing hip ABD and ext. Charges:  Therex x 1, Ther Act x 1, NMR x 1          Total Timed Treatment: 40min    Total Treatment Time: 40min

## 2022-08-17 ENCOUNTER — OFFICE VISIT (OUTPATIENT)
Dept: PHYSICAL THERAPY | Age: 72
End: 2022-08-17
Attending: INTERNAL MEDICINE
Payer: COMMERCIAL

## 2022-08-17 DIAGNOSIS — G89.29 CHRONIC PAIN OF RIGHT KNEE: ICD-10-CM

## 2022-08-17 DIAGNOSIS — M25.561 CHRONIC PAIN OF RIGHT KNEE: ICD-10-CM

## 2022-08-17 PROCEDURE — 97110 THERAPEUTIC EXERCISES: CPT

## 2022-08-17 PROCEDURE — 97112 NEUROMUSCULAR REEDUCATION: CPT

## 2022-08-17 PROCEDURE — 97530 THERAPEUTIC ACTIVITIES: CPT

## 2022-08-17 NOTE — PROGRESS NOTES
Date of Service: 8/17/2022  Dx: Chronic pain of right knee (M25.561,G89.29)              Insurance: [employer] PPO  Insurance Limits: authorization after 12 visits  Visit #: 5  Authorized # of Visits: 12  POC/Auth Expiration: 7/7/23  Date of Last PN: 7/28/22 (Visit #1)  Authorizing Physician/Provider: Nayeli Wyman MD visit: N/A  Fall Risk: Standard         Precautions: N/A            Subjective: The patient reports that he was able to stand 3-4 hours at work. He reports some soreness afterward, but only temporary.      Pain/Symptom Presentation: Patient reports medial-sided knee pain    Pain at rest: 0/10  Pain at worst: 4/10    Objective:     ROM:   Knee Motion PROM AROM    Right Left Right Left   Knee Extension 0 0 0 0   Knee Flexion 135 135 N/A N/A   *indicates activity was associated with pain    Strength/MMT:   Knee Motion Strength    Right Left   Knee Extension 4/5 5/5   Knee Flexion 4/5 5/5   *indicates activity was associated with pain    Hip Motion Strength    Right Left   Hip Flexion 5/5 5/5   Hip Extension 4/5 4/5   Hip ABD 4/5 4/5   *indicates activity was associated with pain    Treatment:  Therapeutic Activities: x 15min  Standing split squat: 2 x 10 (B), cues for technique and posterior chain loading  Lateral lunges: 2 x 10 (B) , cues for posterior chain loading  Step-up: 1 x 10 (B), 8\" step  Step-down: 1 x 10 (B), 8\" step    Therapeutic Exercise: x 15min  Manual hamstring stretch: x 30\" (B)   SLR: 2 x 10 (R), 2# ankle weight  DL bridge: 2 x 10   S/L clamshell: 2 x 10 (B), red theraband  S/L hip ABD: 2 x 10 (R), 2# ankle weight  Elastic band lateral walk: 2 laps x 20ft, red theraband    Neuromuscular Re-education: x 10min  Medial patellar joint mobs: Grade 3, 4 x 10\" (R)  Inf patellar joint mobs: Grade 3, 4 x 10\" (R)   Dead bug uvaldo SB squeeze: 1 x 10 x 5\" (B)   Pallof press: 2 x 10 (B), black power band  SLB on airex: 2 x 30\" (B)     Manual Therapy: x 5min  Soft tissue mobilization: (R) knee    Provider Interactions With Patient:   Verbal and manual cueing on proper performance of the prescribed exercises. Assessment: Becky López continues to make progress in therapy, adding repetitions and advancing exercise challenge and complexity. The patent continues to need occasional cueing for posterior chain loading, but is receptive to cueing and feedback. The patient would benefit from continued therapy for further progression in strength and stability for full return to pain-free ADL's and PLF. Goals:   Short-Term Goals:  1. Patient will improve knee flexion AROM to 135deg pain-free on right LE to normalize ROM to functional range for household and community ambulation. Timeframe: 2-3 weeks. (MET 8/12/22)  2. Patient will improve knee mobility to facilitate erect standing/walking after prolonged sitting without complaints of stiffness. Timeframe: 2-3 weeks. (IN PROGRESS 8/12/22)  Long-Term Goals:  1. The patient will improve LE strength and endurance to facilitate regular standing for extended periods of time for 4-6 hours daily for occupational activities. Timeframe: 6-8 weeks. (MET 8/12/22)  2. The patient will improve LE strength and endurance to facilitate walking distances of 2 mile(s) daily for community integration and occupational activities. Timeframe: 6-8 weeks. 3. Patient will improve LE mobility, strength, and single-leg stabilization to meet strength requirements for reciprocal stair navigation pattern with no asymmetries for ascending and descending 5 flights of stairs daily for household and community ambulation. Timeframe: 6-8 weeks. Plan: Continue to progress functional LE strengthening as tolerated. HEP: Patient was issued a HEP handout 7/28/2022 including DL bridging, S/L clamshell, S/L hip ABD, hooklying PPT, and standing hip ABD and ext. Charges:  Therex x 1, Ther Act x 1, NMR x 1          Total Timed Treatment: 45min    Total Treatment Time: 45min

## 2022-08-19 ENCOUNTER — OFFICE VISIT (OUTPATIENT)
Dept: PHYSICAL THERAPY | Age: 72
End: 2022-08-19
Attending: INTERNAL MEDICINE
Payer: COMMERCIAL

## 2022-08-19 DIAGNOSIS — M25.561 CHRONIC PAIN OF RIGHT KNEE: ICD-10-CM

## 2022-08-19 DIAGNOSIS — G89.29 CHRONIC PAIN OF RIGHT KNEE: ICD-10-CM

## 2022-08-19 PROCEDURE — 97112 NEUROMUSCULAR REEDUCATION: CPT

## 2022-08-19 PROCEDURE — 97110 THERAPEUTIC EXERCISES: CPT

## 2022-08-19 PROCEDURE — 97530 THERAPEUTIC ACTIVITIES: CPT

## 2022-08-19 NOTE — PROGRESS NOTES
Date of Service: 8/19/2022  Dx: Chronic pain of right knee (M25.561,G89.29)              Insurance: [employer] PPO  Insurance Limits: authorization after 12 visits  Visit #: 6  Authorized # of Visits: 12  POC/Auth Expiration: 7/7/23  Date of Last PN: 7/28/22 (Visit #1)   Authorizing Physician/Provider: Lalit Wyman MD visit: N/A  Fall Risk: Standard         Precautions: N/A            Subjective: The patient reports some minor stiffness in his knee this date. 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      Pain/Symptom Presentation: Patient reports medial-sided knee pain    Pain at rest: 0/10  Pain at worst: 4/10    Objective:     ROM:   Knee Motion PROM AROM    Right Left Right Left   Knee Extension 0 0 0 0   Knee Flexion 135 135 N/A N/A   *indicates activity was associated with pain    Strength/MMT:   Knee Motion Strength    Right Left   Knee Extension 4/5 5/5   Knee Flexion 4/5 5/5   *indicates activity was associated with pain    Hip Motion Strength    Right Left   Hip Flexion 5/5 5/5   Hip Extension 4/5 4/5   Hip ABD 4/5 4/5   *indicates activity was associated with pain    Treatment:  Therapeutic Activities: x 12min  Standing split squat: 2 x 10 (B), cues for technique and posterior chain loading  Lateral lunges: 2 x 10 (B) , cues for posterior chain loading  Step-up: 1 x 10 (B), 8\" step  Step-down: 1 x 10 (B), 8\" step    Therapeutic Exercise: x 17min  Manual hamstring stretch: x 30\" (B)   SLR: 2 x 10 (R), 2.5# ankle weight  DL bridge: 2 x 10   S/L clamshell: 2 x 10 (B), red theraband  S/L hip ABD: 2 x 10 (R), 2.5# ankle weight  Elastic band lateral walk: 2 laps x 20ft, red theraband  Elastic base position fwd walk: 2 laps x 20ft, red theraband    Neuromuscular Re-education: x 10min  Medial patellar joint mobs: Grade 3, 4 x 10\" (R)  Inf patellar joint mobs: Grade 3, 4 x 10\" (R)   Dead bug uvaldo SB squeeze: 1 x 10 x 5\" (B)   Pallof press: 2 x 10 (B), black power band  SLB on airex: 2 x 30\" (B)     Manual Therapy: x 5min  Soft tissue mobilization: (R) knee    Provider Interactions With Patient:   Verbal and manual cueing on proper performance of the prescribed exercises. Assessment: Alphonso Baugh is doing well. He reports some mild stiffness in his knee but is otherwise progressing ADL and occupational tolerance well. We will continue to progress the patient's strength for full return to PLF. Goals:   Short-Term Goals:  1. Patient will improve knee flexion AROM to 135deg pain-free on right LE to normalize ROM to functional range for household and community ambulation. Timeframe: 2-3 weeks. (MET 8/12/22)  2. Patient will improve knee mobility to facilitate erect standing/walking after prolonged sitting without complaints of stiffness. Timeframe: 2-3 weeks. (IN PROGRESS 8/12/22)  Long-Term Goals:  1. The patient will improve LE strength and endurance to facilitate regular standing for extended periods of time for 4-6 hours daily for occupational activities. Timeframe: 6-8 weeks. (MET 8/12/22)  2. The patient will improve LE strength and endurance to facilitate walking distances of 2 mile(s) daily for community integration and occupational activities. Timeframe: 6-8 weeks. 3. Patient will improve LE mobility, strength, and single-leg stabilization to meet strength requirements for reciprocal stair navigation pattern with no asymmetries for ascending and descending 5 flights of stairs daily for household and community ambulation. Timeframe: 6-8 weeks. Plan: Continue to progress functional LE strengthening as tolerated. HEP: Patient was issued a HEP handout 7/28/2022 including DL bridging, S/L clamshell, S/L hip ABD, hooklying PPT, and standing hip ABD and ext. Charges:  Therex x 1, Ther Act x 1, NMR x 1          Total Timed Treatment: 44min    Total Treatment Time: 44min

## 2022-08-26 ENCOUNTER — OFFICE VISIT (OUTPATIENT)
Dept: PHYSICAL THERAPY | Age: 72
End: 2022-08-26
Attending: INTERNAL MEDICINE
Payer: COMMERCIAL

## 2022-08-26 DIAGNOSIS — M25.561 CHRONIC PAIN OF RIGHT KNEE: ICD-10-CM

## 2022-08-26 DIAGNOSIS — G89.29 CHRONIC PAIN OF RIGHT KNEE: ICD-10-CM

## 2022-08-26 PROCEDURE — 97112 NEUROMUSCULAR REEDUCATION: CPT

## 2022-08-26 PROCEDURE — 97530 THERAPEUTIC ACTIVITIES: CPT

## 2022-08-26 PROCEDURE — 97110 THERAPEUTIC EXERCISES: CPT

## 2022-08-26 NOTE — PROGRESS NOTES
Date of Service: 8/26/2022  Dx: Chronic pain of right knee (M25.561,G89.29)              Insurance: [employer]  Insurance Limits: authorization after 12 visits  Visit #: 7  Authorized # of Visits: 12  POC/Auth Expiration: 7/7/23  Date of Last PN: 7/28/22 (Visit #1)   Authorizing Physician/Provider: Lalit Wyman MD visit: N/A  Fall Risk: Standard         Precautions: N/A            Subjective: The patient reports good tolerance and no concerns at this point. In discussion with the patient, we will progress him to discharge from therapy at this time. Pain/Symptom Presentation:  Pain at rest: 0/10  Pain at worst: 1-2/10    Objective: Activity Measures:  Sleeping: No Activity Limitation: Patient is without limitations for sleeping. Sitting: No Activity Limitation: Patient is able to sit without limitations. Standing/Walking After Prolonged Sitting: No Activity Limitation: Patient is able to stand and walk without complaints of stiffness. Standing: No Activity Limitation: Patient is without limitations for standing. Walking: No Activity Limitation: Patient is able to walk 2 miles without limitations. Driving: No Activity Limitation: Patient is without limitations for driving. Ascending Stairs: No Activity Limitation: Patient navigates up the stairs with reciprocal gait pattern, no deviations. Descending Stairs: Moderate Activity Limitation: Patient navigates down the stairs with reciprocal gait pattern, no deviations. Stairs: No Activity Limitation: Patient is able to navigate up to 5 flights of stairs.     ROM:   Knee Motion PROM AROM    Right Left Right Left   Knee Extension 0 0 0 0   Knee Flexion 135 135 N/A N/A   *indicates activity was associated with pain    Strength/MMT:   Knee Motion Strength    Right Left   Knee Extension 4+/5 5/5   Knee Flexion 4+/5 5/5   *indicates activity was associated with pain    Hip Motion Strength    Right Left   Hip Flexion 5/5 5/5   Hip Extension 4/5 4/5   Hip ABD 4/5 4/5   *indicates activity was associated with pain    Treatment:  Therapeutic Activities: x 12min  Standing split squat: 2 x 10 (B), cues for technique and posterior chain loading  Lateral lunges: 2 x 10 (B) , cues for posterior chain loading  Step-up: 1 x 10 (B), 8\" step  Step-down: 1 x 10 (B), 8\" step    Therapeutic Exercise: x 15min  Manual hamstring stretch: x 30\" (B)   SLR: 2 x 10 (R), 2.5# ankle weight  DL bridge: 2 x 10   S/L clamshell: 2 x 10 (B), red theraband  Elastic band lateral walk: 2 laps x 20ft, red theraband  Elastic band monster walk: 2 laps x 20ft, red theraband  HEP update: Reviewed new HEP handout with new exercises and progressions, provided verbal and written instructions/cueing for proper technique and common errors/compensations as needed    Neuromuscular Re-education: x 10min  Medial patellar joint mobs: Grade 3, 4 x 10\" (R)  Inf patellar joint mobs: Grade 3, 4 x 10\" (R)   Dead bug uvaldo SB squeeze: 1 x 10 x 5\" (B)   Pallof press: 2 x 10 (B), black power band    Manual Therapy: x 5min  Soft tissue mobilization: (R) knee    Provider Interactions With Patient:   Provided patient with a written copy of exercise instruction which was also documented in patient's electronic medical chart. Assessment: Julián Orr is a 74year old male that presented to physical therapy evaluation for right medial knee pain without known incident. The patient has been seen for 7 sessions in physical therapy with good progression. The patient is no longer with deficits for sitting, standing, walking, and stair navigation. The patient is pleased with his progress and has met all short and long-term goals. The patient will be discharged from therapy at this time. Goals:   Short-Term Goals:  1. Patient will improve knee flexion AROM to 135deg pain-free on right LE to normalize ROM to functional range for household and community ambulation. Timeframe: 2-3 weeks. (MET 8/12/22)  2.  Patient will improve knee mobility to facilitate erect standing/walking after prolonged sitting without complaints of stiffness. Timeframe: 2-3 weeks. (MET 8/26/22)  Long-Term Goals:  1. The patient will improve LE strength and endurance to facilitate regular standing for extended periods of time for 4-6 hours daily for occupational activities. Timeframe: 6-8 weeks. (MET 8/12/22)  2. The patient will improve LE strength and endurance to facilitate walking distances of 2 mile(s) daily for community integration and occupational activities. Timeframe: 6-8 weeks. (MET 8/26/22)  3. Patient will improve LE mobility, strength, and single-leg stabilization to meet strength requirements for reciprocal stair navigation pattern with no asymmetries for ascending and descending 5 flights of stairs daily for household and community ambulation. Timeframe: 6-8 weeks. (MET 8/26/22)    Plan: Patient will be discharged from therapy at this time. HEP: View at [email or web address] using code: [id number]      Charges:  Therex x 1, Ther Act x 1, NMR x 1          Total Timed Treatment: 42min    Total Treatment Time: 42min

## 2022-08-26 NOTE — PATIENT INSTRUCTIONS
Patient was issued a HEP handout from [email or web address]. Exercise selection, recommended resistance, and proper form/technique were reviewed with the patient. Patient verbalized understanding/comprehension of instruction and recommendations. View at [email or web address] using code: [id number] [phone]

## 2022-09-01 ENCOUNTER — TELEPHONE (OUTPATIENT)
Dept: CASE MANAGEMENT | Age: 72
End: 2022-09-01

## 2022-09-01 NOTE — TELEPHONE ENCOUNTER
Patient is due for colorectal screening. FIT test ordered 7/7/22. Pt notified and verbalized understanding.

## 2022-10-17 ENCOUNTER — LAB ENCOUNTER (OUTPATIENT)
Dept: LAB | Age: 72
End: 2022-10-17
Attending: INTERNAL MEDICINE
Payer: COMMERCIAL

## 2023-01-06 ENCOUNTER — OFFICE VISIT (OUTPATIENT)
Dept: INTERNAL MEDICINE CLINIC | Facility: CLINIC | Age: 73
End: 2023-01-06
Payer: COMMERCIAL

## 2023-01-06 ENCOUNTER — LAB ENCOUNTER (OUTPATIENT)
Dept: LAB | Age: 73
End: 2023-01-06
Attending: INTERNAL MEDICINE
Payer: COMMERCIAL

## 2023-01-06 VITALS
SYSTOLIC BLOOD PRESSURE: 134 MMHG | BODY MASS INDEX: 31.83 KG/M2 | WEIGHT: 210 LBS | RESPIRATION RATE: 16 BRPM | OXYGEN SATURATION: 98 % | TEMPERATURE: 98 F | DIASTOLIC BLOOD PRESSURE: 80 MMHG | HEIGHT: 68 IN | HEART RATE: 74 BPM

## 2023-01-06 DIAGNOSIS — Z13.89 SCREENING FOR GENITOURINARY CONDITION: ICD-10-CM

## 2023-01-06 DIAGNOSIS — Z13.0 SCREENING, IRON DEFICIENCY ANEMIA: ICD-10-CM

## 2023-01-06 DIAGNOSIS — Z00.00 ANNUAL PHYSICAL EXAM: Primary | ICD-10-CM

## 2023-01-06 DIAGNOSIS — L98.9 SKIN LESION: ICD-10-CM

## 2023-01-06 DIAGNOSIS — Z13.220 LIPID SCREENING: ICD-10-CM

## 2023-01-06 DIAGNOSIS — Z00.00 LABORATORY EXAMINATION ORDERED AS PART OF A ROUTINE GENERAL MEDICAL EXAMINATION: ICD-10-CM

## 2023-01-06 DIAGNOSIS — Z13.29 THYROID DISORDER SCREEN: ICD-10-CM

## 2023-01-06 DIAGNOSIS — Z12.5 PROSTATE CANCER SCREENING: ICD-10-CM

## 2023-01-06 LAB
ALBUMIN SERPL-MCNC: 4 G/DL (ref 3.4–5)
ALBUMIN/GLOB SERPL: 1 {RATIO} (ref 1–2)
ALP LIVER SERPL-CCNC: 126 U/L
ALT SERPL-CCNC: 35 U/L
ANION GAP SERPL CALC-SCNC: 4 MMOL/L (ref 0–18)
AST SERPL-CCNC: 26 U/L (ref 15–37)
BASOPHILS # BLD AUTO: 0.01 X10(3) UL (ref 0–0.2)
BASOPHILS NFR BLD AUTO: 0.1 %
BILIRUB SERPL-MCNC: 0.7 MG/DL (ref 0.1–2)
BILIRUB UR QL STRIP.AUTO: NEGATIVE
BUN BLD-MCNC: 16 MG/DL (ref 7–18)
CALCIUM BLD-MCNC: 9.4 MG/DL (ref 8.5–10.1)
CHLORIDE SERPL-SCNC: 106 MMOL/L (ref 98–112)
CHOLEST SERPL-MCNC: 129 MG/DL (ref ?–200)
CLARITY UR REFRACT.AUTO: CLEAR
CO2 SERPL-SCNC: 27 MMOL/L (ref 21–32)
COLOR UR AUTO: YELLOW
COMPLEXED PSA SERPL-MCNC: 3.74 NG/ML (ref ?–4)
CREAT BLD-MCNC: 1.26 MG/DL
EOSINOPHIL # BLD AUTO: 0.11 X10(3) UL (ref 0–0.7)
EOSINOPHIL NFR BLD AUTO: 1.4 %
ERYTHROCYTE [DISTWIDTH] IN BLOOD BY AUTOMATED COUNT: 14.5 %
EST. AVERAGE GLUCOSE BLD GHB EST-MCNC: 128 MG/DL (ref 68–126)
FASTING PATIENT LIPID ANSWER: YES
FASTING STATUS PATIENT QL REPORTED: YES
GFR SERPLBLD BASED ON 1.73 SQ M-ARVRAT: 60 ML/MIN/1.73M2 (ref 60–?)
GLOBULIN PLAS-MCNC: 3.9 G/DL (ref 2.8–4.4)
GLUCOSE BLD-MCNC: 116 MG/DL (ref 70–99)
GLUCOSE UR STRIP.AUTO-MCNC: NEGATIVE MG/DL
HBA1C MFR BLD: 6.1 % (ref ?–5.7)
HCT VFR BLD AUTO: 44.7 %
HDLC SERPL-MCNC: 65 MG/DL (ref 40–59)
HGB BLD-MCNC: 14.8 G/DL
IMM GRANULOCYTES # BLD AUTO: 0.04 X10(3) UL (ref 0–1)
IMM GRANULOCYTES NFR BLD: 0.5 %
KETONES UR STRIP.AUTO-MCNC: NEGATIVE MG/DL
LDLC SERPL CALC-MCNC: 50 MG/DL (ref ?–100)
LEUKOCYTE ESTERASE UR QL STRIP.AUTO: NEGATIVE
LYMPHOCYTES # BLD AUTO: 1.47 X10(3) UL (ref 1–4)
LYMPHOCYTES NFR BLD AUTO: 18.8 %
MCH RBC QN AUTO: 31.1 PG (ref 26–34)
MCHC RBC AUTO-ENTMCNC: 33.1 G/DL (ref 31–37)
MCV RBC AUTO: 93.9 FL
MONOCYTES # BLD AUTO: 0.9 X10(3) UL (ref 0.1–1)
MONOCYTES NFR BLD AUTO: 11.5 %
NEUTROPHILS # BLD AUTO: 5.27 X10 (3) UL (ref 1.5–7.7)
NEUTROPHILS # BLD AUTO: 5.27 X10(3) UL (ref 1.5–7.7)
NEUTROPHILS NFR BLD AUTO: 67.7 %
NITRITE UR QL STRIP.AUTO: NEGATIVE
NONHDLC SERPL-MCNC: 64 MG/DL (ref ?–130)
OSMOLALITY SERPL CALC.SUM OF ELEC: 286 MOSM/KG (ref 275–295)
PH UR STRIP.AUTO: 6 [PH] (ref 5–8)
PLATELET # BLD AUTO: 245 10(3)UL (ref 150–450)
POTASSIUM SERPL-SCNC: 5.1 MMOL/L (ref 3.5–5.1)
PROT SERPL-MCNC: 7.9 G/DL (ref 6.4–8.2)
PROT UR STRIP.AUTO-MCNC: NEGATIVE MG/DL
RBC # BLD AUTO: 4.76 X10(6)UL
RBC UR QL AUTO: NEGATIVE
SODIUM SERPL-SCNC: 137 MMOL/L (ref 136–145)
SP GR UR STRIP.AUTO: 1.02 (ref 1–1.03)
TRIGL SERPL-MCNC: 70 MG/DL (ref 30–149)
TSI SER-ACNC: 2.32 MIU/ML (ref 0.36–3.74)
UROBILINOGEN UR STRIP.AUTO-MCNC: <2 MG/DL
VLDLC SERPL CALC-MCNC: 10 MG/DL (ref 0–30)
WBC # BLD AUTO: 7.8 X10(3) UL (ref 4–11)

## 2023-01-06 PROCEDURE — 80061 LIPID PANEL: CPT | Performed by: INTERNAL MEDICINE

## 2023-01-06 PROCEDURE — 83036 HEMOGLOBIN GLYCOSYLATED A1C: CPT | Performed by: INTERNAL MEDICINE

## 2023-01-06 PROCEDURE — 84153 ASSAY OF PSA TOTAL: CPT | Performed by: INTERNAL MEDICINE

## 2023-01-06 PROCEDURE — 80050 GENERAL HEALTH PANEL: CPT | Performed by: INTERNAL MEDICINE

## 2023-01-06 PROCEDURE — 3075F SYST BP GE 130 - 139MM HG: CPT | Performed by: INTERNAL MEDICINE

## 2023-01-06 PROCEDURE — 81003 URINALYSIS AUTO W/O SCOPE: CPT | Performed by: INTERNAL MEDICINE

## 2023-01-06 PROCEDURE — 3008F BODY MASS INDEX DOCD: CPT | Performed by: INTERNAL MEDICINE

## 2023-01-06 PROCEDURE — 99397 PER PM REEVAL EST PAT 65+ YR: CPT | Performed by: INTERNAL MEDICINE

## 2023-01-06 PROCEDURE — 3079F DIAST BP 80-89 MM HG: CPT | Performed by: INTERNAL MEDICINE

## 2023-04-10 ENCOUNTER — TELEPHONE (OUTPATIENT)
Dept: SURGERY | Facility: CLINIC | Age: 73
End: 2023-04-10

## 2023-04-10 NOTE — TELEPHONE ENCOUNTER
Attempted to call patient, unable to leave VM. Will attempt again at a later time. Patient referred to Dr. Roby Ferris by [facility] for Mohs reconstruction of the right ear. Patient is currently scheduled for Mohs procedure on 05/18/2023, to offer 05/23/2023 for reconstruction if the patient is okay with defect through that weekend.

## 2023-04-13 NOTE — TELEPHONE ENCOUNTER
Spoke with Prateek Bliss from [facility] who called for an update regarding our scheduling closure for patients MOHs case that is scheduled for May 18th. I informed Prateek Bliss that we had tried to call the patient and were unable to reach him and we were unbale to leave a VM. Prateek Bliss from [facility] stated that she would try contacting the patient also and tried but was also unable to reach the patient. I called the patient again today and M asking for the patient to call back so we can discussing scheduling. We will await a call back from the patient, so we can proceed with scheduling his surgery.

## 2023-04-14 ENCOUNTER — DOCUMENTATION ONLY (OUTPATIENT)
Dept: SURGERY | Facility: CLINIC | Age: 73
End: 2023-04-14

## 2023-04-14 ENCOUNTER — TELEPHONE (OUTPATIENT)
Dept: SURGERY | Facility: CLINIC | Age: 73
End: 2023-04-14

## 2023-04-14 DIAGNOSIS — C44.212 BASAL CELL CARCINOMA OF RIGHT EAR: Primary | ICD-10-CM

## 2023-04-14 NOTE — TELEPHONE ENCOUNTER
Called patient and confirmed that he is ok with having his MOHs on 5/18 and his reconstruction surgery on 5/23. I informed patient that he would require medical clearance from his PCP and his cardiologist.  Patient was instructed to make those appointments right away and was informed of his appointment for his consult with Dr. Angeles Bentley on May 10 @ 11 am in [place]. Patient verbalized understanding and was appreciative of the call.

## 2023-04-14 NOTE — PATIENT INSTRUCTIONS
Surgeon:              Dr. Dariusz Magaña. Marjorie Florez, PhD                                          Tel:         [phone]                                  Fax:        [phone]    Surgery/Procedure:  Right ear reconstruction with local tissue rearrangement possible skin graft, possible integra, possible flap, possible cartilage graft, 2 hours, general anesthesia, outpatient   5/23/23     AND 3-4 weeks later       Reconstruction of the right ear with skin graft, 2 hours, general anesthesia, outpatient        Hospital:  [facility]: [address] [place] [address], [place], [address]           [phone]  [facility]: [address], [facility]               [phone]    1. Someone will need to drive you to and from the hospital if your procedure is outpatient. 2.Do not drink alcohol or smoke 24 hours prior to your procedure. 3. Bring a picture ID and your insurance card. 4. You will be contacted by the hospital the day before to confirm the procedure time and location. 5. The hospital will also contact you approximately one week before surgery to schedule your COVID test.     6. Do not take any herbal supplements or blood thinners at least one week before your procedure/surgery. This includes NSAID's (aspirin, baby aspirin, Motrin, Ibuprofen, Aleve, Advil, Naproxen, etc), Plavix, fish oil, vitamin E, turmeric, CoQ10, or green tea supplements, etc. *TYLENOL or acetaminophen is ok to take*    7. PRE-OPERATIVE TESTING: History and physical with medical clearance is REQUIRED within 30 days of the surgery date and is mandatory per Dr. Marjorie Florez. *If this is not done, your surgery will be postponed*  MEDICAL CLEARANCE WITH DR. Soo Somers  CBC  CMP  EKG  Cardiac Clearance with Dr. Lm Rasheed    8. Please inform us if you develop any Covid-19 like symptoms, test positive or have been exposed for Covid- 19 prior to surgery.      Consent obtained Needs to be obtained  Photos taken on needs to be obtained

## 2023-05-05 RX ORDER — MULTIVIT-MIN/IRON FUM/FOLIC AC 7.5 MG-4
1 TABLET ORAL DAILY
COMMUNITY

## 2023-05-05 RX ORDER — ASPIRIN 81 MG/1
81 TABLET, CHEWABLE ORAL DAILY
COMMUNITY

## 2023-05-09 ENCOUNTER — OFFICE VISIT (OUTPATIENT)
Dept: INTERNAL MEDICINE CLINIC | Facility: CLINIC | Age: 73
End: 2023-05-09
Payer: COMMERCIAL

## 2023-05-09 VITALS
TEMPERATURE: 98 F | RESPIRATION RATE: 16 BRPM | SYSTOLIC BLOOD PRESSURE: 124 MMHG | DIASTOLIC BLOOD PRESSURE: 82 MMHG | HEIGHT: 68 IN | WEIGHT: 219 LBS | BODY MASS INDEX: 33.19 KG/M2 | HEART RATE: 65 BPM | OXYGEN SATURATION: 96 %

## 2023-05-09 DIAGNOSIS — Z12.11 COLON CANCER SCREENING: ICD-10-CM

## 2023-05-09 DIAGNOSIS — C44.212 BASAL CELL CARCINOMA (BCC) OF SKIN OF RIGHT EAR: ICD-10-CM

## 2023-05-09 DIAGNOSIS — I10 ESSENTIAL HYPERTENSION: ICD-10-CM

## 2023-05-09 DIAGNOSIS — Z01.810 PREOP CARDIOVASCULAR EXAM: Primary | ICD-10-CM

## 2023-05-09 DIAGNOSIS — Z01.818 PRE-OP EXAM: ICD-10-CM

## 2023-05-09 LAB
ATRIAL RATE: 61 BPM
P AXIS: 33 DEGREES
P-R INTERVAL: 186 MS
Q-T INTERVAL: 422 MS
QRS DURATION: 106 MS
QTC CALCULATION (BEZET): 424 MS
R AXIS: 7 DEGREES
T AXIS: 52 DEGREES
VENTRICULAR RATE: 61 BPM

## 2023-05-09 PROCEDURE — 3074F SYST BP LT 130 MM HG: CPT | Performed by: INTERNAL MEDICINE

## 2023-05-09 PROCEDURE — 3079F DIAST BP 80-89 MM HG: CPT | Performed by: INTERNAL MEDICINE

## 2023-05-09 PROCEDURE — 93000 ELECTROCARDIOGRAM COMPLETE: CPT | Performed by: INTERNAL MEDICINE

## 2023-05-09 PROCEDURE — 99214 OFFICE O/P EST MOD 30 MIN: CPT | Performed by: INTERNAL MEDICINE

## 2023-05-09 PROCEDURE — 3008F BODY MASS INDEX DOCD: CPT | Performed by: INTERNAL MEDICINE

## 2023-05-10 ENCOUNTER — OFFICE VISIT (OUTPATIENT)
Dept: SURGERY | Facility: CLINIC | Age: 73
End: 2023-05-10
Payer: COMMERCIAL

## 2023-05-10 DIAGNOSIS — C44.212 BASAL CELL CARCINOMA (BCC) OF ANTIHELIX OF RIGHT EAR: Primary | ICD-10-CM

## 2023-05-10 PROBLEM — C44.211: Status: ACTIVE | Noted: 2023-05-10

## 2023-05-10 PROCEDURE — 99243 OFF/OP CNSLTJ NEW/EST LOW 30: CPT | Performed by: SURGERY

## 2023-05-10 NOTE — PATIENT INSTRUCTIONS
Surgeon:              Dr. Nikhil Mesa. Kyree Hernandez, PhD                                          Tel:         [phone]                                  Fax:        [phone]    Surgery/Procedure: Right ear reconstruction with local tissue rearrangement possible skin graft, possible integra, possible flap, possible cartilage graft, 2 hours, general anesthesia, outpatient   5/23/23     AND 3-4 weeks later       Reconstruction of the right ear with skin graft, 2 hours, general anesthesia, outpatient                                              Hospital:  [facility]: [address] [place] [address], [facility], [address]           [phone]  [facility]: [address], [place]               [phone]    1. Someone will need to drive you to and from the hospital if your procedure is outpatient. 2.Do not drink alcohol or smoke 24 hours prior to your procedure. 3. Bring a picture ID and your insurance card. 4. You will be contacted by the hospital the day before to confirm the procedure time and location. 5. The hospital will also contact you approximately one week before surgery to schedule your COVID test (only if surgery is inpatient/overnight stay). 6. Do not take any herbal supplements or blood thinners at least one week before your procedure/surgery. This includes NSAID's (aspirin, baby aspirin, Motrin, Ibuprofen, Aleve, Advil, Naproxen, etc), Plavix, fish oil, vitamin E, turmeric, CoQ10, or green tea supplements, etc. *TYLENOL or acetaminophen is ok to take*    7. PRE-OPERATIVE TESTING: History and physical with medical clearance is REQUIRED within 30 days of the surgery date and is mandatory per Dr. Kyree Hernandez. *If this is not done, your surgery will be postponed*  MEDICAL CLEARANCE WITH DR. Ana Laura Mitchell   CBC  CMP  EKG    8. Please inform us if you develop any Covid-19 like symptoms, test positive or have been exposed for Covid- 19 prior to surgery.      Consent obtained   Photos taken on 05/10/2023

## 2023-05-19 ENCOUNTER — TELEPHONE (OUTPATIENT)
Dept: SURGERY | Facility: CLINIC | Age: 73
End: 2023-05-19

## 2023-05-19 NOTE — PAT NURSING NOTE
Called ,surgeon office and left message on nurse line requesting response regarding whether the hemogram drawn by pcp office is acceptable versus the CBC with differential that was requested.  Awaiting respone

## 2023-05-22 ENCOUNTER — TELEPHONE (OUTPATIENT)
Dept: SURGERY | Facility: CLINIC | Age: 73
End: 2023-05-22

## 2023-05-22 ENCOUNTER — TELEPHONE (OUTPATIENT)
Dept: INTERNAL MEDICINE CLINIC | Facility: CLINIC | Age: 73
End: 2023-05-22

## 2023-05-22 ENCOUNTER — LAB ENCOUNTER (OUTPATIENT)
Dept: LAB | Age: 73
End: 2023-05-22
Attending: INTERNAL MEDICINE
Payer: COMMERCIAL

## 2023-05-22 DIAGNOSIS — Z01.818 PRE-OP EXAM: Primary | ICD-10-CM

## 2023-05-22 DIAGNOSIS — Z01.818 PRE-OP TESTING: ICD-10-CM

## 2023-05-22 LAB
ALBUMIN SERPL-MCNC: 3.7 G/DL (ref 3.4–5)
ALBUMIN/GLOB SERPL: 0.9 {RATIO} (ref 1–2)
ALP LIVER SERPL-CCNC: 119 U/L
ALT SERPL-CCNC: 30 U/L
ANION GAP SERPL CALC-SCNC: 4 MMOL/L (ref 0–18)
AST SERPL-CCNC: 25 U/L (ref 15–37)
BASOPHILS # BLD AUTO: 0.05 X10(3) UL (ref 0–0.2)
BASOPHILS NFR BLD AUTO: 0.6 %
BILIRUB SERPL-MCNC: 0.6 MG/DL (ref 0.1–2)
BUN BLD-MCNC: 19 MG/DL (ref 7–18)
CALCIUM BLD-MCNC: 10 MG/DL (ref 8.5–10.1)
CHLORIDE SERPL-SCNC: 107 MMOL/L (ref 98–112)
CO2 SERPL-SCNC: 27 MMOL/L (ref 21–32)
CREAT BLD-MCNC: 1.2 MG/DL
EOSINOPHIL # BLD AUTO: 0.16 X10(3) UL (ref 0–0.7)
EOSINOPHIL NFR BLD AUTO: 1.9 %
ERYTHROCYTE [DISTWIDTH] IN BLOOD BY AUTOMATED COUNT: 14.6 %
FASTING STATUS PATIENT QL REPORTED: NO
GFR SERPLBLD BASED ON 1.73 SQ M-ARVRAT: 63 ML/MIN/1.73M2 (ref 60–?)
GLOBULIN PLAS-MCNC: 4.1 G/DL (ref 2.8–4.4)
GLUCOSE BLD-MCNC: 91 MG/DL (ref 70–99)
HCT VFR BLD AUTO: 45.2 %
HGB BLD-MCNC: 14.7 G/DL
IMM GRANULOCYTES # BLD AUTO: 0.05 X10(3) UL (ref 0–1)
IMM GRANULOCYTES NFR BLD: 0.6 %
LYMPHOCYTES # BLD AUTO: 1.62 X10(3) UL (ref 1–4)
LYMPHOCYTES NFR BLD AUTO: 18.9 %
MCH RBC QN AUTO: 30.9 PG (ref 26–34)
MCHC RBC AUTO-ENTMCNC: 32.5 G/DL (ref 31–37)
MCV RBC AUTO: 95 FL
MONOCYTES # BLD AUTO: 1.2 X10(3) UL (ref 0.1–1)
MONOCYTES NFR BLD AUTO: 14 %
NEUTROPHILS # BLD AUTO: 5.47 X10 (3) UL (ref 1.5–7.7)
NEUTROPHILS # BLD AUTO: 5.47 X10(3) UL (ref 1.5–7.7)
NEUTROPHILS NFR BLD AUTO: 64 %
OSMOLALITY SERPL CALC.SUM OF ELEC: 288 MOSM/KG (ref 275–295)
PLATELET # BLD AUTO: 252 10(3)UL (ref 150–450)
POTASSIUM SERPL-SCNC: 4.2 MMOL/L (ref 3.5–5.1)
PROT SERPL-MCNC: 7.8 G/DL (ref 6.4–8.2)
RBC # BLD AUTO: 4.76 X10(6)UL
SODIUM SERPL-SCNC: 138 MMOL/L (ref 136–145)
WBC # BLD AUTO: 8.6 X10(3) UL (ref 4–11)

## 2023-05-22 PROCEDURE — 85025 COMPLETE CBC W/AUTO DIFF WBC: CPT | Performed by: INTERNAL MEDICINE

## 2023-05-22 PROCEDURE — 80053 COMPREHEN METABOLIC PANEL: CPT | Performed by: INTERNAL MEDICINE

## 2023-05-22 NOTE — TELEPHONE ENCOUNTER
Received fax from PAT to confirm hemogram is appropriate to proceed with surgery instead of CBC.  Faxed form back stated ok to proceed with hemogram.

## 2023-05-22 NOTE — TELEPHONE ENCOUNTER
Call made to Dr. Pastrana [facility] office to request new CBC and CMP as patient's previous results are not within 30 days of the surgery date, which is the hospital requirement.

## 2023-05-23 ENCOUNTER — ANESTHESIA EVENT (OUTPATIENT)
Dept: SURGERY | Facility: HOSPITAL | Age: 73
End: 2023-05-23
Payer: COMMERCIAL

## 2023-05-23 ENCOUNTER — HOSPITAL ENCOUNTER (OUTPATIENT)
Facility: HOSPITAL | Age: 73
Setting detail: HOSPITAL OUTPATIENT SURGERY
Discharge: HOME | End: 2023-05-23
Attending: SURGERY | Admitting: SURGERY
Payer: COMMERCIAL

## 2023-05-23 ENCOUNTER — ANESTHESIA (OUTPATIENT)
Dept: SURGERY | Facility: HOSPITAL | Age: 73
End: 2023-05-23
Payer: COMMERCIAL

## 2023-05-23 VITALS
SYSTOLIC BLOOD PRESSURE: 163 MMHG | WEIGHT: 218 LBS | RESPIRATION RATE: 18 BRPM | HEIGHT: 68 IN | OXYGEN SATURATION: 96 % | TEMPERATURE: 98 F | BODY MASS INDEX: 33.04 KG/M2 | DIASTOLIC BLOOD PRESSURE: 90 MMHG | HEART RATE: 72 BPM

## 2023-05-23 DIAGNOSIS — C44.212 BASAL CELL CARCINOMA OF RIGHT EAR: ICD-10-CM

## 2023-05-23 PROCEDURE — 0HX2XZZ TRANSFER RIGHT EAR SKIN, EXTERNAL APPROACH: ICD-10-PCS | Performed by: SURGERY

## 2023-05-23 PROCEDURE — 0HR2XK3 REPLACEMENT OF RIGHT EAR SKIN WITH NONAUTOLOGOUS TISSUE SUBSTITUTE, FULL THICKNESS, EXTERNAL APPROACH: ICD-10-PCS | Performed by: SURGERY

## 2023-05-23 PROCEDURE — 88331 PATH CONSLTJ SURG 1 BLK 1SPC: CPT | Performed by: SURGERY

## 2023-05-23 PROCEDURE — 88305 TISSUE EXAM BY PATHOLOGIST: CPT | Performed by: SURGERY

## 2023-05-23 PROCEDURE — 09R0X7Z REPLACEMENT OF RIGHT EXTERNAL EAR WITH AUTOLOGOUS TISSUE SUBSTITUTE, EXTERNAL APPROACH: ICD-10-PCS | Performed by: SURGERY

## 2023-05-23 DEVICE — INTEGRA® BILAYER MATRIX WOUND DRESSING 2 IN*2 IN (5 CM*5 CM)
Type: IMPLANTABLE DEVICE | Site: EAR | Status: FUNCTIONAL
Brand: INTEGRA®

## 2023-05-23 RX ORDER — ACETAMINOPHEN 500 MG
1000 TABLET ORAL ONCE
Status: DISCONTINUED | OUTPATIENT
Start: 2023-05-23 | End: 2023-05-23 | Stop reason: HOSPADM

## 2023-05-23 RX ORDER — GLYCOPYRROLATE 0.2 MG/ML
INJECTION, SOLUTION INTRAMUSCULAR; INTRAVENOUS AS NEEDED
Status: DISCONTINUED | OUTPATIENT
Start: 2023-05-23 | End: 2023-05-23 | Stop reason: SURG

## 2023-05-23 RX ORDER — LIDOCAINE HYDROCHLORIDE 40 MG/ML
SOLUTION TOPICAL AS NEEDED
Status: DISCONTINUED | OUTPATIENT
Start: 2023-05-23 | End: 2023-05-23 | Stop reason: SURG

## 2023-05-23 RX ORDER — HYDROCODONE BITARTRATE AND ACETAMINOPHEN 5; 325 MG/1; MG/1
2 TABLET ORAL ONCE AS NEEDED
Status: COMPLETED | OUTPATIENT
Start: 2023-05-23 | End: 2023-05-23

## 2023-05-23 RX ORDER — CEFAZOLIN SODIUM/WATER 2 G/20 ML
SYRINGE (ML) INTRAVENOUS
Status: DISCONTINUED
Start: 2023-05-23 | End: 2023-05-23

## 2023-05-23 RX ORDER — LIDOCAINE HYDROCHLORIDE AND EPINEPHRINE 5; 5 MG/ML; UG/ML
INJECTION, SOLUTION INFILTRATION; PERINEURAL AS NEEDED
Status: DISCONTINUED | OUTPATIENT
Start: 2023-05-23 | End: 2023-05-23 | Stop reason: HOSPADM

## 2023-05-23 RX ORDER — MEPERIDINE HYDROCHLORIDE 25 MG/ML
12.5 INJECTION INTRAMUSCULAR; INTRAVENOUS; SUBCUTANEOUS AS NEEDED
Status: DISCONTINUED | OUTPATIENT
Start: 2023-05-23 | End: 2023-05-23

## 2023-05-23 RX ORDER — ONDANSETRON 2 MG/ML
INJECTION INTRAMUSCULAR; INTRAVENOUS AS NEEDED
Status: DISCONTINUED | OUTPATIENT
Start: 2023-05-23 | End: 2023-05-23 | Stop reason: SURG

## 2023-05-23 RX ORDER — DEXAMETHASONE SODIUM PHOSPHATE 4 MG/ML
VIAL (ML) INJECTION AS NEEDED
Status: DISCONTINUED | OUTPATIENT
Start: 2023-05-23 | End: 2023-05-23 | Stop reason: SURG

## 2023-05-23 RX ORDER — LABETALOL HYDROCHLORIDE 5 MG/ML
5 INJECTION, SOLUTION INTRAVENOUS EVERY 5 MIN PRN
Status: DISCONTINUED | OUTPATIENT
Start: 2023-05-23 | End: 2023-05-23

## 2023-05-23 RX ORDER — TRAMADOL HYDROCHLORIDE 50 MG/1
50 TABLET ORAL EVERY 6 HOURS PRN
Qty: 12 TABLET | Refills: 0 | Status: SHIPPED | OUTPATIENT
Start: 2023-05-23

## 2023-05-23 RX ORDER — HYDROMORPHONE HYDROCHLORIDE 1 MG/ML
0.6 INJECTION, SOLUTION INTRAMUSCULAR; INTRAVENOUS; SUBCUTANEOUS EVERY 5 MIN PRN
Status: DISCONTINUED | OUTPATIENT
Start: 2023-05-23 | End: 2023-05-23

## 2023-05-23 RX ORDER — CEFAZOLIN SODIUM/WATER 2 G/20 ML
2 SYRINGE (ML) INTRAVENOUS ONCE
Status: COMPLETED | OUTPATIENT
Start: 2023-05-23 | End: 2023-05-23

## 2023-05-23 RX ORDER — ONDANSETRON 4 MG/1
4 TABLET, FILM COATED ORAL EVERY 8 HOURS PRN
Qty: 10 TABLET | Refills: 0 | Status: SHIPPED | OUTPATIENT
Start: 2023-05-23 | End: 2023-05-23

## 2023-05-23 RX ORDER — SODIUM CHLORIDE, SODIUM LACTATE, POTASSIUM CHLORIDE, CALCIUM CHLORIDE 600; 310; 30; 20 MG/100ML; MG/100ML; MG/100ML; MG/100ML
INJECTION, SOLUTION INTRAVENOUS CONTINUOUS
Status: DISCONTINUED | OUTPATIENT
Start: 2023-05-23 | End: 2023-05-23

## 2023-05-23 RX ORDER — DIPHENHYDRAMINE HYDROCHLORIDE 50 MG/ML
12.5 INJECTION INTRAMUSCULAR; INTRAVENOUS AS NEEDED
Status: DISCONTINUED | OUTPATIENT
Start: 2023-05-23 | End: 2023-05-23

## 2023-05-23 RX ORDER — METOCLOPRAMIDE HYDROCHLORIDE 5 MG/ML
10 INJECTION INTRAMUSCULAR; INTRAVENOUS EVERY 8 HOURS PRN
Status: DISCONTINUED | OUTPATIENT
Start: 2023-05-23 | End: 2023-05-23

## 2023-05-23 RX ORDER — ROCURONIUM BROMIDE 10 MG/ML
INJECTION, SOLUTION INTRAVENOUS AS NEEDED
Status: DISCONTINUED | OUTPATIENT
Start: 2023-05-23 | End: 2023-05-23 | Stop reason: SURG

## 2023-05-23 RX ORDER — LIDOCAINE HYDROCHLORIDE 10 MG/ML
INJECTION, SOLUTION EPIDURAL; INFILTRATION; INTRACAUDAL; PERINEURAL AS NEEDED
Status: DISCONTINUED | OUTPATIENT
Start: 2023-05-23 | End: 2023-05-23 | Stop reason: SURG

## 2023-05-23 RX ORDER — ACETAMINOPHEN 500 MG
1000 TABLET ORAL ONCE AS NEEDED
Status: COMPLETED | OUTPATIENT
Start: 2023-05-23 | End: 2023-05-23

## 2023-05-23 RX ORDER — HYDROMORPHONE HYDROCHLORIDE 1 MG/ML
0.2 INJECTION, SOLUTION INTRAMUSCULAR; INTRAVENOUS; SUBCUTANEOUS EVERY 5 MIN PRN
Status: DISCONTINUED | OUTPATIENT
Start: 2023-05-23 | End: 2023-05-23

## 2023-05-23 RX ORDER — ONDANSETRON 2 MG/ML
4 INJECTION INTRAMUSCULAR; INTRAVENOUS EVERY 6 HOURS PRN
Status: DISCONTINUED | OUTPATIENT
Start: 2023-05-23 | End: 2023-05-23

## 2023-05-23 RX ORDER — HEPARIN SODIUM 5000 [USP'U]/ML
5000 INJECTION, SOLUTION INTRAVENOUS; SUBCUTANEOUS ONCE
Status: COMPLETED | OUTPATIENT
Start: 2023-05-23 | End: 2023-05-23

## 2023-05-23 RX ORDER — NALOXONE HYDROCHLORIDE 0.4 MG/ML
80 INJECTION, SOLUTION INTRAMUSCULAR; INTRAVENOUS; SUBCUTANEOUS AS NEEDED
Status: DISCONTINUED | OUTPATIENT
Start: 2023-05-23 | End: 2023-05-23

## 2023-05-23 RX ORDER — HYDROMORPHONE HYDROCHLORIDE 1 MG/ML
0.4 INJECTION, SOLUTION INTRAMUSCULAR; INTRAVENOUS; SUBCUTANEOUS EVERY 5 MIN PRN
Status: DISCONTINUED | OUTPATIENT
Start: 2023-05-23 | End: 2023-05-23

## 2023-05-23 RX ORDER — HYDROCODONE BITARTRATE AND ACETAMINOPHEN 5; 325 MG/1; MG/1
1 TABLET ORAL ONCE AS NEEDED
Status: COMPLETED | OUTPATIENT
Start: 2023-05-23 | End: 2023-05-23

## 2023-05-23 RX ADMIN — ROCURONIUM BROMIDE 50 MG: 10 INJECTION, SOLUTION INTRAVENOUS at 14:03:00

## 2023-05-23 RX ADMIN — CEFAZOLIN SODIUM/WATER 2 G: 2 G/20 ML SYRINGE (ML) INTRAVENOUS at 14:09:00

## 2023-05-23 RX ADMIN — ONDANSETRON 4 MG: 2 INJECTION INTRAMUSCULAR; INTRAVENOUS at 14:16:00

## 2023-05-23 RX ADMIN — DEXAMETHASONE SODIUM PHOSPHATE 8 MG: 4 MG/ML VIAL (ML) INJECTION at 14:16:00

## 2023-05-23 RX ADMIN — LIDOCAINE HYDROCHLORIDE 2 ML: 40 SOLUTION TOPICAL at 14:03:00

## 2023-05-23 RX ADMIN — GLYCOPYRROLATE 0.3 MG: 0.2 INJECTION, SOLUTION INTRAMUSCULAR; INTRAVENOUS at 14:27:00

## 2023-05-23 RX ADMIN — LIDOCAINE HYDROCHLORIDE 100 MG: 10 INJECTION, SOLUTION EPIDURAL; INFILTRATION; INTRACAUDAL; PERINEURAL at 14:03:00

## 2023-05-23 RX ADMIN — SODIUM CHLORIDE, SODIUM LACTATE, POTASSIUM CHLORIDE, CALCIUM CHLORIDE: 600; 310; 30; 20 INJECTION, SOLUTION INTRAVENOUS at 13:58:00

## 2023-05-23 NOTE — ANESTHESIA PROCEDURE NOTES
Airway  Date/Time: 5/23/2023 2:03 PM  Urgency: elective      General Information and Staff    Patient location during procedure: OR  Anesthesiologist: Nathan Bobby MD  Performed: anesthesiologist   Performed by: Nathan Bobby MD  Authorized by: Nathan Bobby MD      Indications and Patient Condition  Indications for airway management: anesthesia  Spontaneous Ventilation: absent  Sedation level: deep  Preoxygenated: yes  Patient position: sniffing  Mask difficulty assessment: 0 - not attempted    Final Airway Details  Final airway type: endotracheal airway      Successful airway: ETT  Cuffed: yes   Successful intubation technique: Video laryngoscopy  Facilitating devices/methods: intubating stylet  Endotracheal tube insertion site: oral  Blade: GlideScope  Blade size: #4  ETT size (mm): 7.5    Cormack-Lehane Classification: grade I - full view of glottis  Placement verified by: chest auscultation and capnometry   Cuff volume (mL): 11  Measured from: lips  ETT to lips (cm): 23  Number of attempts at approach: 1  Number of other approaches attempted: 0

## 2023-05-23 NOTE — BRIEF OP NOTE
Pre-Operative Diagnosis: Basal cell carcinoma of right ear [C44.212]     Post-Operative Diagnosis: Basal cell carcinoma of right ear [C44.212]      Procedure Performed:   Right ear reconstruction with local tissue rearrangement, integra, cartilage graft    Surgeon(s) and Role:     Kylah Mckinnon MD - Primary    Assistant(s):  PA: KIRSTIN Harris     Surgical Findings: see dictation     Specimen: see pathology     Estimated Blood Loss: Blood Output: 5 mL (5/23/2023  2:55 PM)    KIRSTIN Eller  5/23/2023  3:20 PM

## 2023-05-23 NOTE — DISCHARGE INSTRUCTIONS
Plastic Surgery Home Care Instructions      We hope you were pleased with your care at [facility].  We wish you the best outcome and overall experience with your operation. These instructions will help to minimize pain, optimize healing, and improve the likelihood of a successful result. What To Expect  There may be some spotting of the incision lines for the next few days. Mild bruising, mild swelling and discomfort in the surgical area is typical.   Temporary areas of numbness are typical in the early weeks following your procedure. Normalization of sensation can typically take up to several months following the operation. Bandages (Dressing)  Wear external ear bandage at all times as tolerated. You can adjust this if needed. You will remove this bandage once daily to apply antibiotic ointment (Neosporin, bacitracin etc) daily around the yellow gauze that is sutured in place. If the yellow gauze behind your ear falls off, that is ok. You can leave that off. Bathing/Showers  DO NOT get surgical area wet  No baths, swimming, or hot tubs until you receive medical permission    Pain Medication: Tramadol  Take one tablet every six hours as needed for pain. Do not take narcotics, if you do not have pain. Keep stools soft. Take a stool softener (Metamucil, Milk of Magnesia, Colace). Eating fruit will also help to prevent constipation    Over-The-Counter Medication  You can take Tylenol after surgery for pain relief as needed  You can take Aleve or ibuprofen starting 24 hours after surgery  Take as directed on the bottle    Home Medication  Resume your home medications as instructed  Resume your aspirin at your next dose  Do not resume herbal medications for two weeks    Diet  Resume your normal diet    Activity  No strenuous activity   Don't lay on right ear  You cannot return to physical exercise, sports, or gym workouts until you are allowed to participate in strenuous activity.     Driving  Do not drive, if you are taking pain medication. Return to Work or [employer] can return to work when you are not taking pain medication, if your work does not involve strenuous activity. Contact the office, if you need a medical note. Follow-up Appointment with plastic surgery nurse on 5/30/2023 and with Dr. Srinivas Campbell on 6/7/2023  Our office will call you to set up a time    Questions or Concerns  Call Dr. Griselda Crew office if you experience severe pain not controlled by pain medication, swelling, numbness, tingling, bleeding, fever, or other concerns. If she is not available, another physician will be able to address your concerns. Dustin   Thank you for coming to [facility] for your operation. The nurses and the anesthesiologist try very hard to make sure you receive the best care possible. Your trust in them is greatly appreciated.     Thanks so much,  Dr. Maddy Hopkins PA-C

## 2023-05-24 ENCOUNTER — TELEPHONE (OUTPATIENT)
Dept: SURGERY | Facility: CLINIC | Age: 73
End: 2023-05-24

## 2023-05-24 DIAGNOSIS — C44.212 BASAL CELL CARCINOMA (BCC) OF ANTIHELIX OF RIGHT EAR: Primary | ICD-10-CM

## 2023-05-24 NOTE — OPERATIVE REPORT
[facility]    PATIENT'S NAME: Haritha Cobian   ATTENDING PHYSICIAN: Carisa Carlos MD   OPERATING PHYSICIAN: Carisa Carlos MD   PATIENT ACCOUNT#:   [id number]    LOCATION:  [address] [place] [address] 10  MEDICAL RECORD #:   [id number]       YOB: 1950  ADMISSION DATE:       05/23/2023      OPERATION DATE:  05/23/2023    OPERATIVE REPORT      PREOPERATIVE DIAGNOSIS:    1. Right ear open wound; partial loss of right ear helix, skin and cartilage. 2.   Status post basal cell cancer and Mohs excision. POSTOPERATIVE DIAGNOSIS:    1. Right ear open wound; partial loss of right ear helix, skin and cartilage. 2.   Status post basal cell cancer and Mohs excision. PROCEDURE:  Right ear reconstruction with local tissue rearrangement, helical rim, 2 sq cm; Integra placement dermal substitute, 3 sq cm; ear cartilage graft. ASSISTANT:   Andrew Paris PA-C. Devonte Harris was required to assist during the surgery for safe and effective care by assisting with retraction, Integra placement and bolster placement. INDICATIONS:  This is a 70-year-old gentleman with basal cell cancer on his right ear for which he underwent Mohs resection. The Mohs resection involved large area of the skin of his ear overlying his cartilage as well as some of the cartilage of the helical rim. He now presents for reconstruction. He was seen in the office preoperatively and we discussed potential reconstructive options and the limitations given the extent of his basal cell cancer. We discussed local flap, skin graft, staging, Integra and even possible need for ear prosthesis. Potential risks, complications, benefits, and alternatives were discussed. Risks and complications including, but not limited to, infection, bleeding, scarring, damage to surrounding structures, hematoma, seroma, graft failure, flap failure, ear deformity, Integra failure, need for further surgery.   Patient understands and wishes to proceed. Questions were answered. OPERATIVE TECHNIQUE:  Patient was identified in the preoperative area. Informed consent was obtained. The site was marked. The patient was then brought back to the operating room, placed in supine position. He was adequately padded. Sequential compression devices were applied. General endotracheal anesthesia was administered. Perioperative antibiotics were given. Then, the entire face was prepped and draped in the usual sterile fashion. Then, the procedure was started with cleaning and debriding the open wound. There were fibrinous exudate as well as cautery debris which was gently seen. The defect then showed large amount of exposed cartilage without perichondrium and also missing helical rim cartilage as well as skin missing from the entire superior aspect of the ear. The postauricular skin was intact. Healthy bleeding was seen at the wound edges. Then, the decision was made to do a local tissue rearrangement with advancement flap of the helical rim. We decreased the defect size of the missing portion and this was done using a 15 blade making additional incision at the inferior aspect of the flap about 1 cm and then the flap was elevated and inset with the chondral area with 5-0 Vicryl sutures. Then, the excess cartilage of the scapha area was removed and that portion was used as a cartilage graft between the inferior and superior jean for more stability of that region where the cartilage was missing. This cartilage was sutured in place with 5-0 Vicryl sutures. After the local tissue rearrangement and the cartilage graft, the remaining open wound was covered with Integra dermal soft tissue. This was trimmed to fit the defect and then secured with 5-0 chromic sutures circumferentially and at the base. Then, a Xeroform bolster was secured with 4-0 silk sutures. Head dressing was applied.   The patient tolerated the procedure well and awoke from anesthesia without difficulty. All sponge, instrument, and needle counts were correct at the end of the case. Dictated By Tony Castillo.  Roby Ferris MD  d: 05/23/2023 18:30:15  t: 05/23/2023 22:05:35  [employer] [id number]  [facility]/

## 2023-05-24 NOTE — TELEPHONE ENCOUNTER
Patient call transferred from [facility]. Patient denies any pain at the surgical site, fever, chills, or any other post op issues. We also discussed his second stage surgery date and location. Patient is agreeable and will discuss his options of going through with the skin graft surgery vs. letting the wound heal on its own. He is scheduled for his post operative appointments and will call the office for any questions or concerns.

## 2023-05-30 ENCOUNTER — APPOINTMENT (OUTPATIENT)
Dept: LAB | Age: 73
End: 2023-05-30
Attending: INTERNAL MEDICINE
Payer: COMMERCIAL

## 2023-05-30 ENCOUNTER — NURSE ONLY (OUTPATIENT)
Dept: SURGERY | Facility: CLINIC | Age: 73
End: 2023-05-30
Payer: COMMERCIAL

## 2023-05-30 PROCEDURE — 99024 POSTOP FOLLOW-UP VISIT: CPT | Performed by: SURGERY

## 2023-05-30 NOTE — PROGRESS NOTES
Alpesh Graham is a 74year old male who presents today for a follow-up after right ear reconstruction with local tissue rearrangement of the helical rim, placement of Integra, and ear cartilage graft by Dr. Jeremias Gutierrez on 05/23/2023. Patient is also s/p Mohs procedure for basal cell carcinoma of the right ear by [facility] on 5/18/2023. He denies fever and chills. He denies nausea, vomiting, diarrhea or constipation. His pain is controlled. Physical Exam     Right ear: Incisions are clean, dry, and intact. Integra appears adherent. No erythema or wound drainage. There were no vitals filed for this visit. Assessment and Plan     Alpesh Graham is doing well. Right ear xeroform bolster was removed today. Patient tolerated this well. Neosporin, xerform, and dry gauze were placed over the right ear integra. Prolene sutures left in place. Patient will follow up next week with Dr. Jeremias Gutierrez as scheduled for wound check, possible suture removal, and possible pre op for second stage skin graft surgery scheduled for 06/15/2023. Questions were answered. Patient understands.      Gilberto Gonzalez RN  5/30/2023  10:33 AM

## 2023-06-07 ENCOUNTER — OFFICE VISIT (OUTPATIENT)
Dept: SURGERY | Facility: CLINIC | Age: 73
End: 2023-06-07
Payer: COMMERCIAL

## 2023-06-07 DIAGNOSIS — S01.301D OPEN WOUND OF RIGHT EAR, UNSPECIFIED OPEN WOUND TYPE, SUBSEQUENT ENCOUNTER: Primary | ICD-10-CM

## 2023-06-07 PROBLEM — S01.301A OPEN WOUND OF RIGHT EAR: Status: ACTIVE | Noted: 2023-06-07

## 2023-06-07 PROCEDURE — 99024 POSTOP FOLLOW-UP VISIT: CPT | Performed by: SURGERY

## 2023-06-07 NOTE — PATIENT INSTRUCTIONS
Surgeon:              Dr. Ramu Bella. Anival Jennings, PhD                                          Tel:         [phone]                                  Fax:        [phone]    Surgery/Procedure:      Right ear reconstruction with skin graft, possible local tissue rearrangement  2 hours, general anesthesia, outpatient, 6/15/2023                                Hospital:  [facility]: [address] [place] [address] [address]vd, [place]           [phone]  [facility]: [address], [facility]               [phone]    1. Someone will need to drive you to and from the hospital if your procedure is outpatient. 2.Do not drink alcohol or smoke 24 hours prior to your procedure. 3. Bring a picture ID and your insurance card. 4. You will be contacted by the hospital the day before to confirm the procedure time and location. 5. The hospital will also contact you approximately one week before surgery to schedule your COVID test (only if surgery is inpatient/overnight stay). 6. Do not take any herbal supplements or blood thinners at least one week before your procedure/surgery. This includes NSAID's (aspirin, baby aspirin, Motrin, Ibuprofen, Aleve, Advil, Naproxen, etc), Plavix, fish oil, vitamin E, turmeric, CoQ10, or green tea supplements, etc. *TYLENOL or acetaminophen is ok to take*    7. Please inform us if you develop any Covid-19 like symptoms, test positive or have been exposed for Covid- 19 prior to surgery.      Consent obtained   Photos taken on 6/7/2023

## 2023-06-08 DIAGNOSIS — S01.301D OPEN WOUND OF RIGHT EAR, UNSPECIFIED OPEN WOUND TYPE, SUBSEQUENT ENCOUNTER: ICD-10-CM

## 2023-06-08 DIAGNOSIS — C44.212 BASAL CELL CARCINOMA (BCC) OF ANTIHELIX OF RIGHT EAR: Primary | ICD-10-CM

## 2023-06-15 ENCOUNTER — HOSPITAL ENCOUNTER (OUTPATIENT)
Facility: HOSPITAL | Age: 73
Setting detail: HOSPITAL OUTPATIENT SURGERY
Discharge: HOME | End: 2023-06-15
Attending: SURGERY | Admitting: SURGERY
Payer: COMMERCIAL

## 2023-06-15 ENCOUNTER — ANESTHESIA EVENT (OUTPATIENT)
Dept: SURGERY | Facility: HOSPITAL | Age: 73
End: 2023-06-15
Payer: COMMERCIAL

## 2023-06-15 ENCOUNTER — ANESTHESIA (OUTPATIENT)
Dept: SURGERY | Facility: HOSPITAL | Age: 73
End: 2023-06-15
Payer: COMMERCIAL

## 2023-06-15 VITALS
SYSTOLIC BLOOD PRESSURE: 126 MMHG | RESPIRATION RATE: 16 BRPM | HEIGHT: 68 IN | TEMPERATURE: 98 F | HEART RATE: 80 BPM | BODY MASS INDEX: 32.43 KG/M2 | DIASTOLIC BLOOD PRESSURE: 72 MMHG | WEIGHT: 214 LBS | OXYGEN SATURATION: 93 %

## 2023-06-15 DIAGNOSIS — S01.301D OPEN WOUND OF RIGHT EAR, UNSPECIFIED OPEN WOUND TYPE, SUBSEQUENT ENCOUNTER: Primary | ICD-10-CM

## 2023-06-15 LAB — SARS-COV-2 RNA RESP QL NAA+PROBE: NOT DETECTED

## 2023-06-15 PROCEDURE — 09B0XZZ EXCISION OF RIGHT EXTERNAL EAR, EXTERNAL APPROACH: ICD-10-PCS | Performed by: SURGERY

## 2023-06-15 PROCEDURE — 0HR2XJ3 REPLACEMENT OF RIGHT EAR SKIN WITH SYNTHETIC SUBSTITUTE, FULL THICKNESS, EXTERNAL APPROACH: ICD-10-PCS | Performed by: SURGERY

## 2023-06-15 RX ORDER — NALOXONE HYDROCHLORIDE 0.4 MG/ML
80 INJECTION, SOLUTION INTRAMUSCULAR; INTRAVENOUS; SUBCUTANEOUS AS NEEDED
Status: DISCONTINUED | OUTPATIENT
Start: 2023-06-15 | End: 2023-06-15

## 2023-06-15 RX ORDER — ONDANSETRON 2 MG/ML
4 INJECTION INTRAMUSCULAR; INTRAVENOUS EVERY 6 HOURS PRN
Status: DISCONTINUED | OUTPATIENT
Start: 2023-06-15 | End: 2023-06-15

## 2023-06-15 RX ORDER — FAMOTIDINE 20 MG/1
20 TABLET, FILM COATED ORAL ONCE
Status: COMPLETED | OUTPATIENT
Start: 2023-06-15 | End: 2023-06-15

## 2023-06-15 RX ORDER — HYDROMORPHONE HYDROCHLORIDE 1 MG/ML
0.2 INJECTION, SOLUTION INTRAMUSCULAR; INTRAVENOUS; SUBCUTANEOUS EVERY 5 MIN PRN
Status: DISCONTINUED | OUTPATIENT
Start: 2023-06-15 | End: 2023-06-15

## 2023-06-15 RX ORDER — EPHEDRINE SULFATE 50 MG/ML
INJECTION, SOLUTION INTRAVENOUS AS NEEDED
Status: DISCONTINUED | OUTPATIENT
Start: 2023-06-15 | End: 2023-06-15 | Stop reason: SURG

## 2023-06-15 RX ORDER — MORPHINE SULFATE 10 MG/ML
6 INJECTION, SOLUTION INTRAMUSCULAR; INTRAVENOUS EVERY 10 MIN PRN
Status: DISCONTINUED | OUTPATIENT
Start: 2023-06-15 | End: 2023-06-15

## 2023-06-15 RX ORDER — ACETAMINOPHEN 500 MG
1000 TABLET ORAL ONCE
Status: COMPLETED | OUTPATIENT
Start: 2023-06-15 | End: 2023-06-15

## 2023-06-15 RX ORDER — ONDANSETRON 2 MG/ML
INJECTION INTRAMUSCULAR; INTRAVENOUS AS NEEDED
Status: DISCONTINUED | OUTPATIENT
Start: 2023-06-15 | End: 2023-06-15 | Stop reason: SURG

## 2023-06-15 RX ORDER — LIDOCAINE HYDROCHLORIDE AND EPINEPHRINE 10; 10 MG/ML; UG/ML
INJECTION, SOLUTION INFILTRATION; PERINEURAL AS NEEDED
Status: DISCONTINUED | OUTPATIENT
Start: 2023-06-15 | End: 2023-06-15 | Stop reason: HOSPADM

## 2023-06-15 RX ORDER — MORPHINE SULFATE 4 MG/ML
4 INJECTION, SOLUTION INTRAMUSCULAR; INTRAVENOUS EVERY 10 MIN PRN
Status: DISCONTINUED | OUTPATIENT
Start: 2023-06-15 | End: 2023-06-15

## 2023-06-15 RX ORDER — ROCURONIUM BROMIDE 10 MG/ML
INJECTION, SOLUTION INTRAVENOUS AS NEEDED
Status: DISCONTINUED | OUTPATIENT
Start: 2023-06-15 | End: 2023-06-15 | Stop reason: SURG

## 2023-06-15 RX ORDER — HYDROMORPHONE HYDROCHLORIDE 1 MG/ML
0.6 INJECTION, SOLUTION INTRAMUSCULAR; INTRAVENOUS; SUBCUTANEOUS EVERY 5 MIN PRN
Status: DISCONTINUED | OUTPATIENT
Start: 2023-06-15 | End: 2023-06-15

## 2023-06-15 RX ORDER — CEFAZOLIN SODIUM/WATER 2 G/20 ML
2 SYRINGE (ML) INTRAVENOUS ONCE
Status: COMPLETED | OUTPATIENT
Start: 2023-06-15 | End: 2023-06-15

## 2023-06-15 RX ORDER — LIDOCAINE HYDROCHLORIDE 10 MG/ML
INJECTION, SOLUTION EPIDURAL; INFILTRATION; INTRACAUDAL; PERINEURAL AS NEEDED
Status: DISCONTINUED | OUTPATIENT
Start: 2023-06-15 | End: 2023-06-15 | Stop reason: SURG

## 2023-06-15 RX ORDER — MORPHINE SULFATE 4 MG/ML
2 INJECTION, SOLUTION INTRAMUSCULAR; INTRAVENOUS EVERY 10 MIN PRN
Status: DISCONTINUED | OUTPATIENT
Start: 2023-06-15 | End: 2023-06-15

## 2023-06-15 RX ORDER — HYDROMORPHONE HYDROCHLORIDE 1 MG/ML
0.4 INJECTION, SOLUTION INTRAMUSCULAR; INTRAVENOUS; SUBCUTANEOUS EVERY 5 MIN PRN
Status: DISCONTINUED | OUTPATIENT
Start: 2023-06-15 | End: 2023-06-15

## 2023-06-15 RX ORDER — METOCLOPRAMIDE HYDROCHLORIDE 5 MG/ML
10 INJECTION INTRAMUSCULAR; INTRAVENOUS EVERY 8 HOURS PRN
Status: DISCONTINUED | OUTPATIENT
Start: 2023-06-15 | End: 2023-06-15

## 2023-06-15 RX ORDER — METOCLOPRAMIDE 10 MG/1
10 TABLET ORAL ONCE
Status: COMPLETED | OUTPATIENT
Start: 2023-06-15 | End: 2023-06-15

## 2023-06-15 RX ORDER — TRAMADOL HYDROCHLORIDE 50 MG/1
50 TABLET ORAL EVERY 6 HOURS PRN
Qty: 15 TABLET | Refills: 0 | Status: SHIPPED | OUTPATIENT
Start: 2023-06-15

## 2023-06-15 RX ORDER — DEXAMETHASONE SODIUM PHOSPHATE 4 MG/ML
VIAL (ML) INJECTION AS NEEDED
Status: DISCONTINUED | OUTPATIENT
Start: 2023-06-15 | End: 2023-06-15 | Stop reason: SURG

## 2023-06-15 RX ORDER — SODIUM CHLORIDE, SODIUM LACTATE, POTASSIUM CHLORIDE, CALCIUM CHLORIDE 600; 310; 30; 20 MG/100ML; MG/100ML; MG/100ML; MG/100ML
INJECTION, SOLUTION INTRAVENOUS CONTINUOUS
Status: DISCONTINUED | OUTPATIENT
Start: 2023-06-15 | End: 2023-06-15

## 2023-06-15 RX ORDER — METOPROLOL TARTRATE 5 MG/5ML
2.5 INJECTION INTRAVENOUS ONCE
Status: DISCONTINUED | OUTPATIENT
Start: 2023-06-15 | End: 2023-06-15

## 2023-06-15 RX ADMIN — DEXAMETHASONE SODIUM PHOSPHATE 4 MG: 4 MG/ML VIAL (ML) INJECTION at 09:52:00

## 2023-06-15 RX ADMIN — SODIUM CHLORIDE, SODIUM LACTATE, POTASSIUM CHLORIDE, CALCIUM CHLORIDE: 600; 310; 30; 20 INJECTION, SOLUTION INTRAVENOUS at 11:18:00

## 2023-06-15 RX ADMIN — ONDANSETRON 4 MG: 2 INJECTION INTRAMUSCULAR; INTRAVENOUS at 09:52:00

## 2023-06-15 RX ADMIN — EPHEDRINE SULFATE 5 MG: 50 INJECTION, SOLUTION INTRAVENOUS at 10:27:00

## 2023-06-15 RX ADMIN — LIDOCAINE HYDROCHLORIDE 50 MG: 10 INJECTION, SOLUTION EPIDURAL; INFILTRATION; INTRACAUDAL; PERINEURAL at 09:52:00

## 2023-06-15 RX ADMIN — ROCURONIUM BROMIDE 20 MG: 10 INJECTION, SOLUTION INTRAVENOUS at 10:24:00

## 2023-06-15 RX ADMIN — CEFAZOLIN SODIUM/WATER 2 G: 2 G/20 ML SYRINGE (ML) INTRAVENOUS at 10:01:00

## 2023-06-15 RX ADMIN — ROCURONIUM BROMIDE 10 MG: 10 INJECTION, SOLUTION INTRAVENOUS at 09:52:00

## 2023-06-15 NOTE — ANESTHESIA POSTPROCEDURE EVALUATION
Patient: Abdon Jerome    Procedure Summary     Date: 06/15/23 Room / Location: [address] [place] [address] MAIN OR 03 / [address] [place] [address] MAIN OR    Anesthesia Start: 8268 Anesthesia Stop: 8432    Procedure: Right ear reconstruction with skin graft from right chest (Right: Ear) Diagnosis:       Basal cell carcinoma (BCC) of antihelix of right ear      (Basal cell carcinoma (BCC) of antihelix of right ear [F82.523])    Surgeons: Yuri Mantilla MD Anesthesiologist: Nena Robbins MD    Anesthesia Type: general ASA Status: 3          Anesthesia Type: general    Vitals Value Taken Time   /72 06/15/23 1118   Temp 97.6 06/15/23 1118   Pulse 91 06/15/23 1117   Resp 16 06/15/23 1118   SpO2 94 % 06/15/23 1117   Vitals shown include unvalidated device data.     [address] [place] [address] AN Post Evaluation:   Patient Evaluated in PACU  Patient Participation: complete - patient participated  Level of Consciousness: awake  Pain Score: 0  Pain Management: adequate  Airway Patency:patent  Dental exam unchanged from preop  Yes    Cardiovascular Status: acceptable  Respiratory Status: acceptable  Postoperative Hydration acceptable      Kareem Romero CRNA  6/15/2023 11:18 AM

## 2023-06-15 NOTE — BRIEF OP NOTE
Pre-Operative Diagnosis: Basal cell carcinoma (BCC) of antihelix of right ear [C44.212]     Post-Operative Diagnosis: Basal cell carcinoma (BCC) of antihelix of right ear [C44.212]      Procedure Performed:   Right ear reconstruction with skin graft from right chest    Surgeon(s) and Role:     Betty Osei MD - Primary    Assistant(s):  PA: KIRSTIN Haq     Surgical Findings: see dictation     Specimen: none     Estimated Blood Loss: Blood Output: 5 mL (6/15/2023 11:18 AM)    KIRSTIN Luis  6/15/2023  11:26 AM

## 2023-06-15 NOTE — ANESTHESIA PROCEDURE NOTES
Airway  Date/Time: 6/15/2023 9:54 AM  Urgency: Elective    Airway not difficult    General Information and Staff    Patient location during procedure: OR  Anesthesiologist: Olamide Whitlock MD  Resident/CRNA: Ester Jaime CRNA  Performed: CRNA   Performed by: Ester Jaime CRNA  Authorized by: Olamide Whitlock MD      Indications and Patient Condition  Indications for airway management: anesthesia  Sedation level: deep  Preoxygenated: yes  Patient position: sniffing  Mask difficulty assessment: 2 - vent by mask + OA or adjuvant +/- NMBA    Final Airway Details  Final airway type: endotracheal airway      Successful airway: ETT  Cuffed: yes   Successful intubation technique: Video laryngoscopy  Facilitating devices/methods: intubating stylet  Endotracheal tube insertion site: oral  Blade: GlideScope  Blade size: #4  ETT size (mm): 7.5    Cormack-Lehane Classification: grade I - full view of glottis  Placement verified by: capnometry   Measured from: lips  ETT to lips (cm): 21  Number of attempts at approach: 1  Number of other approaches attempted: 0

## 2023-06-16 NOTE — OPERATIVE REPORT
[facility]    PATIENT'S NAME: Maegan Shabazz   ATTENDING PHYSICIAN: Carisa Mcrae MD   OPERATING PHYSICIAN: Carisa Mcrae MD   PATIENT ACCOUNT#:   [id number]    LOCATION:  [facility]  MEDICAL RECORD #:   [id number]       YOB: 1950  ADMISSION DATE:       06/15/2023      OPERATION DATE:  06/15/2023    OPERATIVE REPORT      PREOPERATIVE DIAGNOSIS:  Open wound, right ear. POSTOPERATIVE DIAGNOSIS:  Open wound, right ear. PROCEDURE:  1. Tangential excision preparation of wound bed, open wound, right ear, 6 sq cm. 2.   Full-thickness skin graft from right clavicular area, 6 sq cm. 3.   Excision of excess skin, retroauricular area, with wound closure, 6 mm x 7 mm. ASSISTANT:  Shirley Orellana PA-C    ANESTHESIA:  General endotracheal anesthesia. COMPLICATIONS:  None. BLOOD LOSS:  Minimal.    INDICATIONS:  This is a 40-year-old gentleman with history of basal cell cancer on his ear for which he underwent Mohs resection and then a 2-stage reconstruction initially with local tissue rearrangement and Integra placement. Now, he was seen in the office to complete the reconstruction with skin graft placement and excision of the excess skin in the retroauricular area. Potential risks, complications, benefits, and alternatives were discussed. Risks and complications including, but not limited to infection, bleeding, scarring, damage to surrounding structures, hematoma, seroma, graft failure, ear deformity, need for further surgery. The patient understands and wishes to proceed. Questions were answered. OPERATIVE TECHNIQUE:  The patient was identified in the preoperative area. Informed consent was obtained. The site was marked. The patient was then brought back to the operating room, placed in supine position. He was adequately padded. Sequential compression devices were applied. Perioperative antibiotics were given.   Then, his entire face and neck and chest were prepped and draped in the usual sterile fashion. Then, the procedure was started with tangential excision of the wound bed after removal of the silicone from the Integra. Healthy bleeding was seen at the wound bed. This area was 6 sq cm. After the tangential excision of the wound bed and preparation, then a full-thickness skin graft was harvested from the right clavicular area. This was 2 x 3 cm in size. After the wound graft was placed and inset, the graft was then secured to the ear and secured in place with 5-0 chromic sutures. Then, the skin graft donor site was closed with 3-0 interrupted Vicryl sutures and 4-0 interrupted Vicryl sutures as well as 4-0 Monocryl subcuticular sutures, Dermabond and Steri-Strips. Then, in the retroauricular area was excess skin that was left for perfusion from the last surgery, and this was excised. This was 7 mm, and then a closure was performed of the wound with 5-0 Prolene sutures. Then, a Xeroform bolster was applied and secured with 4-0 silk sutures. A head dressing was applied. The patient tolerated the procedure well and awoke from anesthesia without difficulty. All sponge, instrument, and needle counts were correct at the end of the case. Dictated By Carlitos Ceron.  Dada Billingsley MD  d: 06/15/2023 15:59:07  t: 06/15/2023 20:14:54  [facility] [id number]  ZBK/

## 2023-06-23 ENCOUNTER — OFFICE VISIT (OUTPATIENT)
Dept: SURGERY | Facility: CLINIC | Age: 73
End: 2023-06-23
Payer: COMMERCIAL

## 2023-06-23 DIAGNOSIS — S01.301D OPEN WOUND OF RIGHT EAR, UNSPECIFIED OPEN WOUND TYPE, SUBSEQUENT ENCOUNTER: Primary | ICD-10-CM

## 2023-06-23 PROCEDURE — 99024 POSTOP FOLLOW-UP VISIT: CPT | Performed by: PHYSICIAN ASSISTANT

## 2023-06-23 NOTE — PROGRESS NOTES
Trina Madrid is a 74year old male who presents today for a follow-up after full-thickness skin graft from right clavicular area to right ear, excision of excess skin in the retroauricular area with wound closure on 6/15/2023 with Dr. Dang Shelton. He denies fever and chills. He denies nausea, vomiting, diarrhea or constipation. His pain is controlled. Physical Exam     Surgical incisions are clean, dry, and intact. No erythema, no wound drainage. HEENT: Right ear Xeroform bolster removed. Skin graft adherent. No wound drainage or erythema. Prolene sutures intact. Right clavicular area incision clean, dry and intact without wound drainage or wound dehiscence. There is minimal irritation underneath the Steri-Strips. No erythema. There were no vitals filed for this visit. Assessment and Plan     Trina Madrid is doing well s/p full-thickness skin graft from right clavicular area to right ear, excision of excess skin in the retroauricular area with wound closure on 6/15/2023    Xeroform bolster and Prolene sutures were removed from the right ear. A new dressing of Neosporin, Xeroform, Telfa, paper tape was placed. He will change this daily. Neosporin and Telfa and paper tape were placed over the right clavicular area incision. He will change this daily. He should still not get his ear wet. He will follow-up in 1 to 2 weeks with Dr. Dang Shelton for recheck. Questions were answered. Patient understands.      Myrna Jean Baptiste  6/23/2023  4:01 PM

## 2023-07-05 ENCOUNTER — OFFICE VISIT (OUTPATIENT)
Dept: SURGERY | Facility: CLINIC | Age: 73
End: 2023-07-05
Payer: COMMERCIAL

## 2023-07-05 DIAGNOSIS — S01.301D OPEN WOUND OF RIGHT EAR, UNSPECIFIED OPEN WOUND TYPE, SUBSEQUENT ENCOUNTER: Primary | ICD-10-CM

## 2023-07-05 PROCEDURE — 99024 POSTOP FOLLOW-UP VISIT: CPT | Performed by: SURGERY

## 2023-07-05 NOTE — PROGRESS NOTES
Luis Daniel Barnes is a 74year old male who presents today for a follow-up after full-thickness skin graft from right clavicular area to right ear, excision of excess skin in the retroauricular area with wound closure on 6/15/2023. He denies fever and chills. He denies nausea, vomiting, diarrhea or constipation. His pain is controlled. Physical Exam     HEENT: Right ear skin graft adherent. No wound drainage or erythema. Right ear and right clavicle incisions clean, dry and intact. There were no vitals filed for this visit. Assessment and Plan     Luis Daniel Barnes is doing well s/p full-thickness skin graft from right clavicular area to right ear, excision of excess skin in the retroauricular area with wound closure on 6/15/2023. He is overall healing well. We reviewed options for scar management including vitamin E oil, silicone products, scar massage and sun protection/sun block. He can apply Aquaphor to the incisions and skin graft. He can resume showering. He will follow-up in 3-4 months for recheck. Questions were answered. Patient understands. Hypothyroidism

## 2023-07-13 ENCOUNTER — TELEPHONE (OUTPATIENT)
Dept: INTERNAL MEDICINE CLINIC | Facility: CLINIC | Age: 73
End: 2023-07-13

## 2023-07-13 ENCOUNTER — OFFICE VISIT (OUTPATIENT)
Dept: INTERNAL MEDICINE CLINIC | Facility: CLINIC | Age: 73
End: 2023-07-13
Payer: COMMERCIAL

## 2023-07-13 VITALS
OXYGEN SATURATION: 98 % | HEIGHT: 68 IN | HEART RATE: 78 BPM | BODY MASS INDEX: 33.19 KG/M2 | DIASTOLIC BLOOD PRESSURE: 80 MMHG | RESPIRATION RATE: 16 BRPM | TEMPERATURE: 98 F | SYSTOLIC BLOOD PRESSURE: 160 MMHG | WEIGHT: 219 LBS

## 2023-07-13 DIAGNOSIS — I10 ESSENTIAL HYPERTENSION: Primary | ICD-10-CM

## 2023-07-13 DIAGNOSIS — Z00.00 ANNUAL PHYSICAL EXAM: Primary | ICD-10-CM

## 2023-07-13 DIAGNOSIS — Z12.5 PROSTATE CANCER SCREENING: ICD-10-CM

## 2023-07-13 DIAGNOSIS — E78.2 MIXED HYPERLIPIDEMIA: ICD-10-CM

## 2023-07-13 DIAGNOSIS — I25.10 CAD IN NATIVE ARTERY: ICD-10-CM

## 2023-07-13 PROBLEM — S01.301A OPEN WOUND OF RIGHT EAR: Status: RESOLVED | Noted: 2023-06-07 | Resolved: 2023-07-13

## 2023-07-13 PROCEDURE — 3077F SYST BP >= 140 MM HG: CPT | Performed by: INTERNAL MEDICINE

## 2023-07-13 PROCEDURE — 3079F DIAST BP 80-89 MM HG: CPT | Performed by: INTERNAL MEDICINE

## 2023-07-13 PROCEDURE — 99214 OFFICE O/P EST MOD 30 MIN: CPT | Performed by: INTERNAL MEDICINE

## 2023-07-13 PROCEDURE — 3008F BODY MASS INDEX DOCD: CPT | Performed by: INTERNAL MEDICINE

## 2023-07-13 RX ORDER — EZETIMIBE 10 MG/1
10 TABLET ORAL DAILY
Qty: 90 TABLET | Refills: 1 | Status: SHIPPED | OUTPATIENT
Start: 2023-07-13

## 2023-07-13 RX ORDER — OLMESARTAN MEDOXOMIL 40 MG/1
40 TABLET ORAL DAILY
Qty: 90 TABLET | Refills: 1 | Status: SHIPPED | OUTPATIENT
Start: 2023-07-13

## 2023-07-13 RX ORDER — ATORVASTATIN CALCIUM 40 MG/1
40 TABLET, FILM COATED ORAL
Qty: 90 TABLET | Refills: 3 | Status: SHIPPED | OUTPATIENT
Start: 2023-07-13

## 2023-07-13 NOTE — TELEPHONE ENCOUNTER
Preferred Lab: [facility] Bliss: 07/13/23  Next OV & Reason:  01/12/2024   Patient was notified to fast 8-10 hours drinking only water/black coffee prior to having labs drawn and may need to submit urine sample. Hemoglobin A1C will be ordered if patient has diabetes or prediabetes.   Lab orders will be placed by end of day:  yes  Patient requesting return call:  yes

## 2023-07-13 NOTE — TELEPHONE ENCOUNTER
Confirmed with front office staff, patient requesting lab orders be placed for January annual exam. Orders placed with future date of 01/01/24.

## 2023-07-13 NOTE — TELEPHONE ENCOUNTER
Completed on 10/17/22 with negative result. Reordered on 5/30/23 with negative result. Discussed with JAVY Cobb supervisor. Form faxed back with provider office error documentation added as repeat testing was not needed. Form sent to scan.

## 2023-07-14 ENCOUNTER — LAB ENCOUNTER (OUTPATIENT)
Dept: LAB | Age: 73
End: 2023-07-14
Attending: INTERNAL MEDICINE
Payer: COMMERCIAL

## 2023-07-14 DIAGNOSIS — Z12.5 PROSTATE CANCER SCREENING: ICD-10-CM

## 2023-07-14 DIAGNOSIS — Z00.00 ANNUAL PHYSICAL EXAM: ICD-10-CM

## 2023-07-14 LAB
ALBUMIN SERPL-MCNC: 3.7 G/DL (ref 3.4–5)
ALBUMIN/GLOB SERPL: 1.1 {RATIO} (ref 1–2)
ALP LIVER SERPL-CCNC: 123 U/L
ALT SERPL-CCNC: 31 U/L
ANION GAP SERPL CALC-SCNC: 8 MMOL/L (ref 0–18)
AST SERPL-CCNC: 25 U/L (ref 15–37)
BASOPHILS # BLD AUTO: 0.02 X10(3) UL (ref 0–0.2)
BASOPHILS NFR BLD AUTO: 0.2 %
BILIRUB SERPL-MCNC: 0.7 MG/DL (ref 0.1–2)
BILIRUB UR QL STRIP.AUTO: NEGATIVE
BUN BLD-MCNC: 14 MG/DL (ref 7–18)
CALCIUM BLD-MCNC: 9.1 MG/DL (ref 8.5–10.1)
CHLORIDE SERPL-SCNC: 107 MMOL/L (ref 98–112)
CHOLEST SERPL-MCNC: 138 MG/DL (ref ?–200)
CLARITY UR REFRACT.AUTO: CLEAR
CO2 SERPL-SCNC: 23 MMOL/L (ref 21–32)
COLOR UR AUTO: YELLOW
COMPLEXED PSA SERPL-MCNC: 3.78 NG/ML (ref ?–4)
CREAT BLD-MCNC: 1.12 MG/DL
EOSINOPHIL # BLD AUTO: 0.16 X10(3) UL (ref 0–0.7)
EOSINOPHIL NFR BLD AUTO: 2 %
ERYTHROCYTE [DISTWIDTH] IN BLOOD BY AUTOMATED COUNT: 15 %
FASTING PATIENT LIPID ANSWER: YES
FASTING STATUS PATIENT QL REPORTED: YES
GFR SERPLBLD BASED ON 1.73 SQ M-ARVRAT: 69 ML/MIN/1.73M2 (ref 60–?)
GLOBULIN PLAS-MCNC: 3.5 G/DL (ref 2.8–4.4)
GLUCOSE BLD-MCNC: 105 MG/DL (ref 70–99)
GLUCOSE UR STRIP.AUTO-MCNC: NEGATIVE MG/DL
HCT VFR BLD AUTO: 44 %
HDLC SERPL-MCNC: 67 MG/DL (ref 40–59)
HGB BLD-MCNC: 14.2 G/DL
IMM GRANULOCYTES # BLD AUTO: 0.04 X10(3) UL (ref 0–1)
IMM GRANULOCYTES NFR BLD: 0.5 %
KETONES UR STRIP.AUTO-MCNC: NEGATIVE MG/DL
LDLC SERPL CALC-MCNC: 58 MG/DL (ref ?–100)
LEUKOCYTE ESTERASE UR QL STRIP.AUTO: NEGATIVE
LYMPHOCYTES # BLD AUTO: 1.72 X10(3) UL (ref 1–4)
LYMPHOCYTES NFR BLD AUTO: 21.4 %
MCH RBC QN AUTO: 30.6 PG (ref 26–34)
MCHC RBC AUTO-ENTMCNC: 32.3 G/DL (ref 31–37)
MCV RBC AUTO: 94.8 FL
MONOCYTES # BLD AUTO: 0.97 X10(3) UL (ref 0.1–1)
MONOCYTES NFR BLD AUTO: 12.1 %
NEUTROPHILS # BLD AUTO: 5.13 X10 (3) UL (ref 1.5–7.7)
NEUTROPHILS # BLD AUTO: 5.13 X10(3) UL (ref 1.5–7.7)
NEUTROPHILS NFR BLD AUTO: 63.8 %
NITRITE UR QL STRIP.AUTO: NEGATIVE
NONHDLC SERPL-MCNC: 71 MG/DL (ref ?–130)
OSMOLALITY SERPL CALC.SUM OF ELEC: 287 MOSM/KG (ref 275–295)
PH UR STRIP.AUTO: 6 [PH] (ref 5–8)
PLATELET # BLD AUTO: 226 10(3)UL (ref 150–450)
POTASSIUM SERPL-SCNC: 4.7 MMOL/L (ref 3.5–5.1)
PROT SERPL-MCNC: 7.2 G/DL (ref 6.4–8.2)
PROT UR STRIP.AUTO-MCNC: NEGATIVE MG/DL
RBC # BLD AUTO: 4.64 X10(6)UL
RBC UR QL AUTO: NEGATIVE
SODIUM SERPL-SCNC: 138 MMOL/L (ref 136–145)
SP GR UR STRIP.AUTO: 1.01 (ref 1–1.03)
TRIGL SERPL-MCNC: 64 MG/DL (ref 30–149)
TSI SER-ACNC: 2.13 MIU/ML (ref 0.36–3.74)
UROBILINOGEN UR STRIP.AUTO-MCNC: <2 MG/DL
VLDLC SERPL CALC-MCNC: 9 MG/DL (ref 0–30)
WBC # BLD AUTO: 8 X10(3) UL (ref 4–11)

## 2023-07-14 PROCEDURE — 81003 URINALYSIS AUTO W/O SCOPE: CPT | Performed by: INTERNAL MEDICINE

## 2023-07-14 PROCEDURE — 80061 LIPID PANEL: CPT | Performed by: INTERNAL MEDICINE

## 2023-07-14 PROCEDURE — 84153 ASSAY OF PSA TOTAL: CPT | Performed by: INTERNAL MEDICINE

## 2023-07-14 PROCEDURE — 80050 GENERAL HEALTH PANEL: CPT | Performed by: INTERNAL MEDICINE

## 2023-11-15 ENCOUNTER — OFFICE VISIT (OUTPATIENT)
Dept: SURGERY | Facility: CLINIC | Age: 73
End: 2023-11-15
Payer: COMMERCIAL

## 2023-11-15 DIAGNOSIS — C44.212 BASAL CELL CARCINOMA (BCC) OF ANTIHELIX OF RIGHT EAR: Primary | ICD-10-CM

## 2023-11-15 PROCEDURE — 99213 OFFICE O/P EST LOW 20 MIN: CPT | Performed by: SURGERY

## 2023-11-15 NOTE — CONSULTS
Estabilshed Patient Consultation    Chief Complaint: s/p right ear reconstruction     History of Present Illness:   Diamante Wiley is a 74year old male who returns to the office s/p full-thickness skin graft from right clavicular area to right ear, excision of excess skin in the retroauricular area with wound closure on 6/15/2023. He previously underwent  right ear reconstruction with local tissue rearrangement of the helical rim, placement of Integra, and ear cartilage graft on 05/23/2023. S/p Mohs procedure for basal cell carcinoma of the right ear by [facility] on 5/18/2023. He is here today for recheck. Past Medical History:      Past Medical History:   Diagnosis Date    Cancer ([facility] [address]     right ear    Essential hypertension     High blood pressure     High cholesterol     Hyperlipidemia     Myocardial infarct ([facility] [address] 10/19/2013    1 stent    Visual impairment     glasses         Past Surgical History:  Past Surgical History:   Procedure Laterality Date    HC EMBOLIZATION STENT IMPLANT  2013    OTHER SURGICAL HISTORY  05/23/2023    right ear surgery r/t basal cell         Medications:    No outpatient medications have been marked as taking for the 11/15/23 encounter (Appointment) with Elisa Chapman MD.         Allergies: Allergies   Allergen Reactions    Shellfish-Derived Products HIVES    Ace Inhibitors Coughing         Family History:   Family History   Problem Relation Age of Onset    Heart Attack Father          Social History:  History   Alcohol Use No     Comment: quit       History   Smoking Status    Former    Types: Cigarettes    Quit date: 10/30/2013   Smokeless Tobacco    Never       History   Drug Use No         Review of Systems:    The patient reports: see HPI  All other systems are unremarkable. Physical Exam:    There were no vitals taken for this visit. Constitutional: The patient is an alert, oriented and well-developed.      Neurologic: Speech patterns and movements are normal.     Psychiatric: Affect is appropriate. Eyes: Conjunctiva are clear, non-icteric. PERRL    HEENT: Right ear skin graft with complete take  Flap viable. No wound drainage or erythema. Right ear and right clavicle incisions well healed. Integument/Skin: see HEENT exam    Respiratory: Normal respiratory effort. Cardiovascular: no cyanosis, no edema    Musculoskeletal: Extremities unremarkable, without edema, tenderness or deformities    Impression:   Johan Khan  is a 74year old s/p full-thickness skin graft from right clavicular area to right ear, excision of excess skin in the retroauricular area with wound closure on 6/15/2023     Discussion and Plan:  The patient was counseled on the different treatment options. He can continue scar management. We reviewed options for scar management including vitamin E oil, silicone products, scar massage and sun protection/sun block. He will follow up with me as needed. He should follow up with his dermatologist for skin screenings. 25 minutes were spent with the patient, from which 20 minutes were spent counseling the patient and coordinating care.

## 2023-12-27 RX ORDER — EZETIMIBE 10 MG/1
10 TABLET ORAL DAILY
Qty: 90 TABLET | Refills: 0 | Status: SHIPPED | OUTPATIENT
Start: 2023-12-27

## 2023-12-27 NOTE — TELEPHONE ENCOUNTER
Last time medication was refilled 7/13/23  Quantity and # of refills 90 w 1  Last OV 7/13/23  Next OV 1/12/24  Passed protocol, Rx sent.

## 2024-01-06 RX ORDER — OLMESARTAN MEDOXOMIL 40 MG/1
40 TABLET ORAL DAILY
Qty: 90 TABLET | Refills: 0 | Status: SHIPPED | OUTPATIENT
Start: 2024-01-06

## 2024-01-06 NOTE — TELEPHONE ENCOUNTER
Last time medication was refilled 07/13/2023  Quantity and # of refills 90 w 1  Last OV 07/13/2023  Next OV   Future Appointments   Date Time Provider Department Center   1/12/2024  9:30 AM Star Perez MD [facility] [address]       Passed protocol, Rx sent.

## 2024-03-18 RX ORDER — EZETIMIBE 10 MG/1
10 TABLET ORAL DAILY
Qty: 90 TABLET | Refills: 0 | Status: SHIPPED | OUTPATIENT
Start: 2024-03-18

## 2024-03-18 NOTE — TELEPHONE ENCOUNTER
Last time medication was refilled 12/27/23  Quantity and # of refills 90 w 0   Last OV 7/13/23  Next OV 3/21/24  Medication protocol passed, Rx sent

## 2024-03-21 ENCOUNTER — LAB ENCOUNTER (OUTPATIENT)
Dept: LAB | Age: 74
End: 2024-03-21
Attending: INTERNAL MEDICINE
Payer: COMMERCIAL

## 2024-03-21 ENCOUNTER — OFFICE VISIT (OUTPATIENT)
Dept: INTERNAL MEDICINE CLINIC | Facility: CLINIC | Age: 74
End: 2024-03-21
Payer: COMMERCIAL

## 2024-03-21 VITALS
WEIGHT: 215 LBS | TEMPERATURE: 98 F | RESPIRATION RATE: 16 BRPM | HEIGHT: 68 IN | BODY MASS INDEX: 32.58 KG/M2 | HEART RATE: 88 BPM | SYSTOLIC BLOOD PRESSURE: 122 MMHG | DIASTOLIC BLOOD PRESSURE: 72 MMHG | OXYGEN SATURATION: 98 %

## 2024-03-21 DIAGNOSIS — Z00.00 ROUTINE GENERAL MEDICAL EXAMINATION AT A HEALTH CARE FACILITY: ICD-10-CM

## 2024-03-21 DIAGNOSIS — M79.10 MYALGIA: ICD-10-CM

## 2024-03-21 DIAGNOSIS — Z00.00 ANNUAL PHYSICAL EXAM: Primary | ICD-10-CM

## 2024-03-21 DIAGNOSIS — Z12.11 COLON CANCER SCREENING: ICD-10-CM

## 2024-03-21 DIAGNOSIS — Z12.5 PROSTATE CANCER SCREENING: ICD-10-CM

## 2024-03-21 LAB
ALBUMIN SERPL-MCNC: 4.1 G/DL (ref 3.4–5)
ALBUMIN/GLOB SERPL: 1.1 {RATIO} (ref 1–2)
ALP LIVER SERPL-CCNC: 130 U/L
ALT SERPL-CCNC: 35 U/L
ANION GAP SERPL CALC-SCNC: 4 MMOL/L (ref 0–18)
AST SERPL-CCNC: 23 U/L (ref 15–37)
BASOPHILS # BLD AUTO: 0.04 X10(3) UL (ref 0–0.2)
BASOPHILS NFR BLD AUTO: 0.4 %
BILIRUB SERPL-MCNC: 0.7 MG/DL (ref 0.1–2)
BILIRUB UR QL STRIP.AUTO: NEGATIVE
BUN BLD-MCNC: 18 MG/DL (ref 9–23)
CALCIUM BLD-MCNC: 9.6 MG/DL (ref 8.5–10.1)
CHLORIDE SERPL-SCNC: 107 MMOL/L (ref 98–112)
CHOLEST SERPL-MCNC: 153 MG/DL (ref ?–200)
CLARITY UR REFRACT.AUTO: CLEAR
CO2 SERPL-SCNC: 27 MMOL/L (ref 21–32)
COMPLEXED PSA SERPL-MCNC: 4 NG/ML (ref ?–4)
CREAT BLD-MCNC: 1.22 MG/DL
EGFRCR SERPLBLD CKD-EPI 2021: 62 ML/MIN/1.73M2 (ref 60–?)
EOSINOPHIL # BLD AUTO: 0.13 X10(3) UL (ref 0–0.7)
EOSINOPHIL NFR BLD AUTO: 1.4 %
ERYTHROCYTE [DISTWIDTH] IN BLOOD BY AUTOMATED COUNT: 14.7 %
FASTING PATIENT LIPID ANSWER: YES
FASTING STATUS PATIENT QL REPORTED: YES
GLOBULIN PLAS-MCNC: 3.7 G/DL (ref 2.8–4.4)
GLUCOSE BLD-MCNC: 107 MG/DL (ref 70–99)
GLUCOSE UR STRIP.AUTO-MCNC: NORMAL MG/DL
HCT VFR BLD AUTO: 43.4 %
HDLC SERPL-MCNC: 69 MG/DL (ref 40–59)
HGB BLD-MCNC: 14.3 G/DL
IMM GRANULOCYTES # BLD AUTO: 0.04 X10(3) UL (ref 0–1)
IMM GRANULOCYTES NFR BLD: 0.4 %
KETONES UR STRIP.AUTO-MCNC: NEGATIVE MG/DL
LDLC SERPL CALC-MCNC: 72 MG/DL (ref ?–100)
LEUKOCYTE ESTERASE UR QL STRIP.AUTO: NEGATIVE
LYMPHOCYTES # BLD AUTO: 1.81 X10(3) UL (ref 1–4)
LYMPHOCYTES NFR BLD AUTO: 19.9 %
MCH RBC QN AUTO: 30.7 PG (ref 26–34)
MCHC RBC AUTO-ENTMCNC: 32.9 G/DL (ref 31–37)
MCV RBC AUTO: 93.1 FL
MONOCYTES # BLD AUTO: 1.1 X10(3) UL (ref 0.1–1)
MONOCYTES NFR BLD AUTO: 12.1 %
NEUTROPHILS # BLD AUTO: 5.96 X10 (3) UL (ref 1.5–7.7)
NEUTROPHILS # BLD AUTO: 5.96 X10(3) UL (ref 1.5–7.7)
NEUTROPHILS NFR BLD AUTO: 65.8 %
NITRITE UR QL STRIP.AUTO: NEGATIVE
NONHDLC SERPL-MCNC: 84 MG/DL (ref ?–130)
OSMOLALITY SERPL CALC.SUM OF ELEC: 288 MOSM/KG (ref 275–295)
PH UR STRIP.AUTO: 5 [PH] (ref 5–8)
PLATELET # BLD AUTO: 234 10(3)UL (ref 150–450)
POTASSIUM SERPL-SCNC: 5 MMOL/L (ref 3.5–5.1)
PROT SERPL-MCNC: 7.8 G/DL (ref 6.4–8.2)
PROT UR STRIP.AUTO-MCNC: NEGATIVE MG/DL
RBC # BLD AUTO: 4.66 X10(6)UL
RBC UR QL AUTO: NEGATIVE
SODIUM SERPL-SCNC: 138 MMOL/L (ref 136–145)
SP GR UR STRIP.AUTO: 1.01 (ref 1–1.03)
TRIGL SERPL-MCNC: 61 MG/DL (ref 30–149)
TSI SER-ACNC: 2.68 MIU/ML (ref 0.36–3.74)
UROBILINOGEN UR STRIP.AUTO-MCNC: NORMAL MG/DL
VLDLC SERPL CALC-MCNC: 9 MG/DL (ref 0–30)
WBC # BLD AUTO: 9.1 X10(3) UL (ref 4–11)

## 2024-03-21 PROCEDURE — 3074F SYST BP LT 130 MM HG: CPT | Performed by: INTERNAL MEDICINE

## 2024-03-21 PROCEDURE — 80050 GENERAL HEALTH PANEL: CPT | Performed by: INTERNAL MEDICINE

## 2024-03-21 PROCEDURE — 84153 ASSAY OF PSA TOTAL: CPT | Performed by: INTERNAL MEDICINE

## 2024-03-21 PROCEDURE — 80061 LIPID PANEL: CPT | Performed by: INTERNAL MEDICINE

## 2024-03-21 PROCEDURE — 99397 PER PM REEVAL EST PAT 65+ YR: CPT | Performed by: INTERNAL MEDICINE

## 2024-03-21 PROCEDURE — 3008F BODY MASS INDEX DOCD: CPT | Performed by: INTERNAL MEDICINE

## 2024-03-21 PROCEDURE — 81003 URINALYSIS AUTO W/O SCOPE: CPT | Performed by: INTERNAL MEDICINE

## 2024-03-21 PROCEDURE — 3078F DIAST BP <80 MM HG: CPT | Performed by: INTERNAL MEDICINE

## 2024-03-21 NOTE — PROGRESS NOTES
Subjective:   Patient ID: Dustin Silva is a 73 year old male.    HPI  Dustin Silva is a 73 year old male who presents for a complete physical exam.   HPI:   Pt complains of whole body myalgia. Sxs for months. Constant. Pt is on a statin    PAST MEDICAL, SOCIAL, FAMILY HISTORIES REVIEWED WITH PT      Immunization History   Administered Date(s) Administered    Afluria, 9 Years & >, IM 10/30/2015    Covid-19 Vaccine Moderna 100 mcg/0.5 ml 02/13/2021, 03/13/2021, 10/25/2021    Covid-19 Vaccine Moderna Bivalent 50mcg/0.5mL 12+ years 10/18/2022    FLU VAC High Dose 65 YRS & Older PRSV Free (81315) 10/08/2021, 10/03/2022, 09/21/2023    FLU VAC QIV SPLIT 3 YRS AND OLDER (91407) 10/16/2019    FLUAD High Dose 65 yr and older (02191) 10/11/2017    FLUZONE 6 months and older PFS 0.5 ml (60711) 10/07/2016, 09/08/2020    Flublok Quad Influenza Vaccine (63935) 10/16/2019    Fluzone Vaccine Medicare () 09/26/2018, 10/08/2021    HIGH DOSE FLU 65 YRS AND OLDER PRSV FREE SINGLE D (05299) FLU CLINIC 10/03/2022    Influenza 10/30/2015, 10/19/2018, 10/05/2020, 12/01/2022    Pfizer Covid-19 Vaccine 30mcg/0.3ml 12yrs+ (7167-4932) 10/13/2023    Pneumococcal (Prevnar 13) 11/13/2015    Pneumovax 23 10/19/2013    RSV, recombinant, RSVpreF, adjuvanted (Arexvy) 11/15/2023    TDAP 10/30/2015    Zoster Vaccine Live (Zostavax) 07/11/2018    Zoster Vaccine Recombinant Adjuvanted (Shingrix) 07/11/2018, 09/26/2018     Wt Readings from Last 6 Encounters:   03/21/24 215 lb (97.5 kg)   07/13/23 219 lb (99.3 kg)   06/15/23 214 lb (97.1 kg)   05/23/23 218 lb (98.9 kg)   05/09/23 219 lb (99.3 kg)   01/06/23 210 lb (95.3 kg)     Body mass index is 32.69 kg/m².     Lab Results   Component Value Date     (H) 07/14/2023    GLU 91 05/22/2023     (H) 01/06/2023     Lab Results   Component Value Date    CHOLEST 138 07/14/2023    CHOLEST 129 01/06/2023    CHOLEST 195 01/06/2022     Lab Results   Component Value Date    HDL 67  (H) 07/14/2023    HDL 65 (H) 01/06/2023    HDL 60 (H) 01/06/2022     Lab Results   Component Value Date    LDL 58 07/14/2023    LDL 50 01/06/2023     (H) 01/06/2022     Lab Results   Component Value Date    AST 25 07/14/2023    AST 25 05/22/2023    AST 26 01/06/2023     Lab Results   Component Value Date    ALT 31 07/14/2023    ALT 30 05/22/2023    ALT 35 01/06/2023     Lab Results   Component Value Date    PSA 2.57 12/18/2017    PSA 2.530 12/14/2016        Current Outpatient Medications   Medication Sig Dispense Refill    EZETIMIBE 10 MG Oral Tab TAKE ONE TABLET BY MOUTH ONE TIME DAILY 90 tablet 0    OLMESARTAN MEDOXOMIL 40 MG Oral Tab TAKE ONE TABLET BY MOUTH ONE TIME DAILY 90 tablet 0    atorvastatin 40 MG Oral Tab Take 1 tablet (40 mg total) by mouth once daily. 90 tablet 3    metoprolol tartrate 25 MG Oral Tab Take 0.5 tablets (12.5 mg total) by mouth 2 (two) times daily. 180 tablet 1    aspirin 81 MG Oral Chew Tab Chew 1 tablet (81 mg total) by mouth daily.      Multiple Vitamins-Minerals (MULTI-VITAMIN/MINERALS) Oral Tab Take 1 tablet by mouth daily.        Past Medical History:   Diagnosis Date    Cancer (HCC)     right ear    Essential hypertension     High blood pressure     High cholesterol     Hyperlipidemia     Myocardial infarct (HCC) 10/19/2013    1 stent    Visual impairment     glasses      Past Surgical History:   Procedure Laterality Date    HC EMBOLIZATION STENT IMPLANT  2013    OTHER SURGICAL HISTORY  05/23/2023    right ear surgery r/t basal cell      Family History   Problem Relation Age of Onset    Heart Attack Father       Social History:  Social History     Socioeconomic History    Marital status:    Tobacco Use    Smoking status: Former     Types: Cigarettes     Quit date: 10/30/2013     Years since quitting: 10.3    Smokeless tobacco: Never   Vaping Use    Vaping Use: Never used   Substance and Sexual Activity    Alcohol use: No     Comment: quit    Drug use: No   Other  Topics Concern    Caffeine Concern Yes     Comment: 1-2 cups daily     Exercise Yes    Seat Belt Yes    Special Diet No    Stress Concern No    Weight Concern No      Occ: yes. .   Exercise: once per week,  twice per week.  Diet: watches fats closely and watches sugar closely     REVIEW OF SYSTEMS:   A comprehensive 10 point review of systems was completed.     Pertinent positives and negatives noted in the HPI.    EXAM:   /72   Pulse 88   Temp 98 °F (36.7 °C)   Resp 16   Ht 5' 8\" (1.727 m)   Wt 215 lb (97.5 kg)   SpO2 98%   BMI 32.69 kg/m²   Body mass index is 32.69 kg/m².   GENERAL: well developed, well nourished,in no apparent distress  SKIN: no rashes,no suspicious lesions  HEENT: atraumatic, normocephalic,ears and throat are clear  EYES:PERRLA, EOMI, conjunctiva are clear  NECK: supple,no adenopathy,no bruits  LUNGS: clear to auscultation  CARDIO: RRR without murmur  GI: good BS's,no masses, HSM or tenderness  :deferred  MUSCULOSKELETAL: back is not tender  EXTREMITIES: no cyanosis, clubbing or edema  NEURO: Oriented times three,motor and sensory are grossly intact    ASSESSMENT AND PLAN:   Dustin Silva is a 73 year old male who presents for a complete physical exam.  Body mass index is 32.69 kg/m²., recommended low fat diet and aerobic exercise 30 minutes three times weekly. Health maintenance, will check fasting Lipids, CMP, CBC and PSA.   Hold statin x 2 weeks to see if myalgias resolve. If they do change to crestor 20 mg daily  Declines screening colonoscopy check fobt  . Pt info handouts given for: exercise, low fat diet,   The patient indicates understanding of these issues and agrees to the plan.  The patient is asked to return for CPX in 12 m.    History/Other:   Review of Systems  Current Outpatient Medications   Medication Sig Dispense Refill    EZETIMIBE 10 MG Oral Tab TAKE ONE TABLET BY MOUTH ONE TIME DAILY 90 tablet 0    OLMESARTAN MEDOXOMIL 40 MG Oral Tab TAKE ONE TABLET  BY MOUTH ONE TIME DAILY 90 tablet 0    atorvastatin 40 MG Oral Tab Take 1 tablet (40 mg total) by mouth once daily. 90 tablet 3    metoprolol tartrate 25 MG Oral Tab Take 0.5 tablets (12.5 mg total) by mouth 2 (two) times daily. 180 tablet 1    aspirin 81 MG Oral Chew Tab Chew 1 tablet (81 mg total) by mouth daily.      Multiple Vitamins-Minerals (MULTI-VITAMIN/MINERALS) Oral Tab Take 1 tablet by mouth daily.       Allergies:  Allergies   Allergen Reactions    Shellfish-Derived Products HIVES    Ace Inhibitors Coughing       Objective:   Physical Exam    Assessment & Plan:   1. Annual physical exam    2. Prostate cancer screening    3. Routine general medical examination at a health care facility    4. Myalgia        Orders Placed This Encounter   Procedures    CBC With Differential With Platelet    Comp Metabolic Panel (14)    Lipid Panel    TSH W Reflex To Free T4    PSA Total, Screen    Urinalysis, Routine       Meds This Visit:  Requested Prescriptions      No prescriptions requested or ordered in this encounter       Imaging & Referrals:  None

## 2024-03-28 RX ORDER — OLMESARTAN MEDOXOMIL 40 MG/1
40 TABLET ORAL DAILY
Qty: 90 TABLET | Refills: 0 | Status: SHIPPED | OUTPATIENT
Start: 2024-03-28

## 2024-03-28 NOTE — TELEPHONE ENCOUNTER
Last time medication was refilled 01/06/2024  Last OV 03/21/2024  Next OV due/scheduled   Future Appointments   Date Time Provider Department Center   9/19/2024  9:45 AM Star Perez MD [facility] [address]       Passed protocol, Rx sent.

## 2024-04-08 ENCOUNTER — TELEPHONE (OUTPATIENT)
Dept: INTERNAL MEDICINE CLINIC | Facility: CLINIC | Age: 74
End: 2024-04-08

## 2024-04-08 DIAGNOSIS — E78.2 MIXED HYPERLIPIDEMIA: Primary | ICD-10-CM

## 2024-04-08 RX ORDER — ROSUVASTATIN CALCIUM 20 MG/1
20 TABLET, COATED ORAL DAILY
Qty: 30 TABLET | Refills: 5 | Status: SHIPPED | OUTPATIENT
Start: 2024-04-08

## 2024-04-08 NOTE — TELEPHONE ENCOUNTER
LOV 3/21/24 Hold statin x 2 weeks to see if myalgias resolve. If they do change to crestor 20 mg daily     Spoke with pt informing him rx sent for 30 days of crestor, call office for 90 day supply if he is tolerating medication.     Rx sent.

## 2024-04-08 NOTE — TELEPHONE ENCOUNTER
Condition Update : PT states that he is feeling better and he wants to start the new  cholesterol medication

## 2024-06-10 ENCOUNTER — LAB ENCOUNTER (OUTPATIENT)
Dept: LAB | Age: 74
End: 2024-06-10
Attending: INTERNAL MEDICINE

## 2024-06-11 ENCOUNTER — OFFICE VISIT (OUTPATIENT)
Dept: INTERNAL MEDICINE CLINIC | Facility: CLINIC | Age: 74
End: 2024-06-11
Payer: COMMERCIAL

## 2024-06-11 VITALS
OXYGEN SATURATION: 94 % | HEIGHT: 68 IN | WEIGHT: 218 LBS | HEART RATE: 80 BPM | DIASTOLIC BLOOD PRESSURE: 70 MMHG | BODY MASS INDEX: 33.04 KG/M2 | RESPIRATION RATE: 16 BRPM | TEMPERATURE: 98 F | SYSTOLIC BLOOD PRESSURE: 136 MMHG

## 2024-06-11 DIAGNOSIS — M79.10 MYALGIA: ICD-10-CM

## 2024-06-11 DIAGNOSIS — I10 ESSENTIAL HYPERTENSION: Primary | ICD-10-CM

## 2024-06-11 PROCEDURE — 99214 OFFICE O/P EST MOD 30 MIN: CPT | Performed by: INTERNAL MEDICINE

## 2024-06-11 PROCEDURE — 3078F DIAST BP <80 MM HG: CPT | Performed by: INTERNAL MEDICINE

## 2024-06-11 PROCEDURE — 3008F BODY MASS INDEX DOCD: CPT | Performed by: INTERNAL MEDICINE

## 2024-06-11 PROCEDURE — 3075F SYST BP GE 130 - 139MM HG: CPT | Performed by: INTERNAL MEDICINE

## 2024-06-11 NOTE — PROGRESS NOTES
Subjective:   Patient ID: Dustin Silva is a 74 year old male.    Hypertension    Muscle Pain     Dustin Silva is a 74 year old male.     HPI:   Patient presents for recheck of his hypertension. Pt has been taking medications as instructed, no medication side effects, home BP monitoring in the range of 120's systolic and 70's diastolic. Still having generalized myalgias. Stopped statin for a few weeks. This did not alleviate sxs. Nothing aggravating. Sxs reolve on its own      Wt Readings from Last 6 Encounters:   06/11/24 218 lb (98.9 kg)   03/21/24 215 lb (97.5 kg)   07/13/23 219 lb (99.3 kg)   06/15/23 214 lb (97.1 kg)   05/23/23 218 lb (98.9 kg)   05/09/23 219 lb (99.3 kg)     Body mass index is 33.15 kg/m².    Lab Results   Component Value Date    CHOLEST 153 03/21/2024    CHOLEST 138 07/14/2023    CHOLEST 129 01/06/2023     Lab Results   Component Value Date    HDL 69 (H) 03/21/2024    HDL 67 (H) 07/14/2023    HDL 65 (H) 01/06/2023     Lab Results   Component Value Date    TRIG 61 03/21/2024    TRIG 64 07/14/2023    TRIG 70 01/06/2023     Lab Results   Component Value Date    LDL 72 03/21/2024    LDL 58 07/14/2023    LDL 50 01/06/2023     Lab Results   Component Value Date    AST 23 03/21/2024    AST 25 07/14/2023    AST 25 05/22/2023     Lab Results   Component Value Date    ALT 35 03/21/2024    ALT 31 07/14/2023    ALT 30 05/22/2023     Lab Results   Component Value Date     (H) 03/21/2024     (H) 07/14/2023    GLU 91 05/22/2023       Current Outpatient Medications   Medication Sig Dispense Refill    rosuvastatin (CRESTOR) 20 MG Oral Tab Take 1 tablet (20 mg total) by mouth daily. 30 tablet 5    metoprolol tartrate 25 MG Oral Tab Take 0.5 tablets (12.5 mg total) by mouth 2 (two) times daily. 180 tablet 1    aspirin 81 MG Oral Chew Tab Chew 1 tablet (81 mg total) by mouth daily.      Multiple Vitamins-Minerals (MULTI-VITAMIN/MINERALS) Oral Tab Take 1 tablet by mouth daily.         Past Medical History:    Cancer (HCC)    right ear    Essential hypertension    High blood pressure    High cholesterol    Hyperlipidemia    Myocardial infarct (HCC)    1 stent    Visual impairment    glasses      Past Surgical History:   Procedure Laterality Date    Hc embolization stent implant  2013    Other surgical history  05/23/2023    right ear surgery r/t basal cell      Social History:    Social History     Socioeconomic History    Marital status:    Tobacco Use    Smoking status: Former     Current packs/day: 0.00     Types: Cigarettes     Quit date: 10/30/2013     Years since quitting: 10.6    Smokeless tobacco: Never   Vaping Use    Vaping status: Never Used   Substance and Sexual Activity    Alcohol use: No     Comment: quit    Drug use: No   Other Topics Concern    Caffeine Concern Yes     Comment: 1-2 cups daily     Exercise Yes    Seat Belt Yes    Special Diet No    Stress Concern No    Weight Concern No     Social Determinants of Health      Received from [facility], [facility]    Social Connections    Received from [facility], [facility]    Housing Stability         REVIEW OF SYSTEMS:   GENERAL HEALTH: feels well otherwise  SKIN: denies any unusual skin lesions or rashes  RESPIRATORY: denies shortness of breath with exertion  CARDIOVASCULAR: denies chest pain on exertion  GI: denies abdominal pain and denies heartburn  NEURO: denies headaches    EXAM:   /70   Pulse 80   Temp 97.8 °F (36.6 °C)   Resp 16   Ht 5' 8\" (1.727 m)   Wt 218 lb (98.9 kg)   SpO2 94%   BMI 33.15 kg/m²   GENERAL: well developed, well nourished,in no apparent distress  SKIN: no rashes,no suspicious lesions  HEENT: atraumatic, normocephalic,ears and throat are clear  NECK: supple,no adenopathy,no bruits  LUNGS: clear to auscultation  CARDIO: RRR without murmur  GI: good BS's,no masses, HSM or tenderness  EXTREMITIES: no  cyanosis, clubbing or edema    ASSESSMENT AND PLAN:   Pt presents for a recheck of his hypertension. BP is well controlled, no significant medication side effects noted.  PLAN: will continue present medications, reviewed diet, exercise and weight control, myalgias- trial off zetia to see if this alleviated sxs . The patient indicates understanding of these issues and agrees to the plan.  The patient is asked to return in 3 m.      History/Other:   Review of Systems  Current Outpatient Medications   Medication Sig Dispense Refill    rosuvastatin (CRESTOR) 20 MG Oral Tab Take 1 tablet (20 mg total) by mouth daily. 30 tablet 5    EZETIMIBE 10 MG Oral Tab TAKE ONE TABLET BY MOUTH ONE TIME DAILY 90 tablet 0    metoprolol tartrate 25 MG Oral Tab Take 0.5 tablets (12.5 mg total) by mouth 2 (two) times daily. 180 tablet 1    aspirin 81 MG Oral Chew Tab Chew 1 tablet (81 mg total) by mouth daily.      Multiple Vitamins-Minerals (MULTI-VITAMIN/MINERALS) Oral Tab Take 1 tablet by mouth daily.       Allergies:  Allergies   Allergen Reactions    Shellfish-Derived Products HIVES    Ace Inhibitors Coughing       Objective:   Physical Exam    Assessment & Plan:   No diagnosis found.    No orders of the defined types were placed in this encounter.      Meds This Visit:  Requested Prescriptions      No prescriptions requested or ordered in this encounter       Imaging & Referrals:  None

## 2024-06-12 RX ORDER — EZETIMIBE 10 MG/1
10 TABLET ORAL DAILY
Qty: 90 TABLET | Refills: 0 | OUTPATIENT
Start: 2024-06-12

## 2024-06-12 NOTE — TELEPHONE ENCOUNTER
EZETIMIBE 10 MG Oral Tab     The original prescription was discontinued on 6/11/2024 by Star Perez MD. Renewing this prescription may not be appropriate.

## 2024-06-12 NOTE — TELEPHONE ENCOUNTER
Azucena Morrison is a 67 y.o. female with a history of DM II, HTN, CKD 3 and a complex past surgical history, which sounds like perforated diverticulitis treated with Aiyana procedure in [place] in 2015, after which she developed enterocutaneous fistulae.  Status post colostomy reversal at [facility] in 2016.  Subsequently has developed new enterocutaneous fistulae and a complex matted area of coloenteric fistulae at the site of her sigmoid anastomosis.  Currently she is primarily symptomatic from what looks like a partial small-bowel obstruction associated with these matted loops. Colorectal is planning on taking patient to OR for LAR on Friday, 7/31/20. Urology was consulted for bilateral ureteral catheter placement pre-operatively.   Per nurse, okay to deny request

## 2024-06-19 RX ORDER — OLMESARTAN MEDOXOMIL 40 MG/1
40 TABLET ORAL DAILY
Qty: 90 TABLET | Refills: 0 | Status: SHIPPED | OUTPATIENT
Start: 2024-06-19

## 2024-06-19 NOTE — TELEPHONE ENCOUNTER
Patient called back and states he is taking medication. Medication passed protocol, refill sent.    Last time medication was refilled 03/28/2024  Last office visit  06/11/2024  Next office visit due/scheduled   Future Appointments   Date Time Provider Department Center   9/19/2024  9:45 AM Star Perez MD [facility] [address] [facility]

## 2024-09-17 RX ORDER — OLMESARTAN MEDOXOMIL 40 MG/1
40 TABLET ORAL DAILY
Qty: 90 TABLET | Refills: 0 | Status: SHIPPED | OUTPATIENT
Start: 2024-09-17

## 2024-09-17 NOTE — TELEPHONE ENCOUNTER
Last time medication was refilled 06/19/2024  Last office visit  06/11/2024  Next office visit due/scheduled   Future Appointments   Date Time Provider Department Center   9/19/2024  9:45 AM Star Perez MD [facility]       Passed protocol, Medication sent.

## 2024-09-19 ENCOUNTER — OFFICE VISIT (OUTPATIENT)
Dept: INTERNAL MEDICINE CLINIC | Facility: CLINIC | Age: 74
End: 2024-09-19
Payer: COMMERCIAL

## 2024-09-19 VITALS
BODY MASS INDEX: 33.04 KG/M2 | TEMPERATURE: 98 F | SYSTOLIC BLOOD PRESSURE: 138 MMHG | WEIGHT: 218 LBS | HEIGHT: 68 IN | DIASTOLIC BLOOD PRESSURE: 82 MMHG | HEART RATE: 65 BPM | OXYGEN SATURATION: 97 % | RESPIRATION RATE: 16 BRPM

## 2024-09-19 DIAGNOSIS — E78.2 MIXED HYPERLIPIDEMIA: Primary | ICD-10-CM

## 2024-09-19 DIAGNOSIS — I10 ESSENTIAL HYPERTENSION: ICD-10-CM

## 2024-09-19 DIAGNOSIS — I25.10 CAD IN NATIVE ARTERY: ICD-10-CM

## 2024-09-19 PROBLEM — Z85.828 HISTORY OF BASAL CELL CANCER: Status: ACTIVE | Noted: 2024-09-19

## 2024-09-19 PROBLEM — C44.211: Status: RESOLVED | Noted: 2023-05-10 | Resolved: 2024-09-19

## 2024-09-19 PROCEDURE — 3008F BODY MASS INDEX DOCD: CPT | Performed by: INTERNAL MEDICINE

## 2024-09-19 PROCEDURE — 99214 OFFICE O/P EST MOD 30 MIN: CPT | Performed by: INTERNAL MEDICINE

## 2024-09-19 PROCEDURE — 3075F SYST BP GE 130 - 139MM HG: CPT | Performed by: INTERNAL MEDICINE

## 2024-09-19 PROCEDURE — 3079F DIAST BP 80-89 MM HG: CPT | Performed by: INTERNAL MEDICINE

## 2024-09-19 NOTE — PROGRESS NOTES
Subjective:   Patient ID: Dustin Silva is a 74 year old male.    HPI  Dustin Silva is a 74 year old male.     HPI:   Patient presents for recheck of his hypertension, HLD and CAD. Pt has been taking medications as instructed, no medication side effects, home BP monitoring in the range of 130's systolic and 70's diastolic.  No cp sob. Denoes ruiz    Wt Readings from Last 6 Encounters:   09/19/24 218 lb (98.9 kg)   06/11/24 218 lb (98.9 kg)   03/21/24 215 lb (97.5 kg)   07/13/23 219 lb (99.3 kg)   06/15/23 214 lb (97.1 kg)   05/23/23 218 lb (98.9 kg)     Body mass index is 33.15 kg/m².    Lab Results   Component Value Date    CHOLEST 153 03/21/2024    CHOLEST 138 07/14/2023    CHOLEST 129 01/06/2023     Lab Results   Component Value Date    HDL 69 (H) 03/21/2024    HDL 67 (H) 07/14/2023    HDL 65 (H) 01/06/2023     Lab Results   Component Value Date    TRIG 61 03/21/2024    TRIG 64 07/14/2023    TRIG 70 01/06/2023     Lab Results   Component Value Date    LDL 72 03/21/2024    LDL 58 07/14/2023    LDL 50 01/06/2023     Lab Results   Component Value Date    AST 23 03/21/2024    AST 25 07/14/2023    AST 25 05/22/2023     Lab Results   Component Value Date    ALT 35 03/21/2024    ALT 31 07/14/2023    ALT 30 05/22/2023     Lab Results   Component Value Date     (H) 03/21/2024     (H) 07/14/2023    GLU 91 05/22/2023       Current Outpatient Medications   Medication Sig Dispense Refill    OLMESARTAN MEDOXOMIL 40 MG Oral Tab TAKE ONE TABLET BY MOUTH ONE TIME DAILY 90 tablet 0    rosuvastatin (CRESTOR) 20 MG Oral Tab Take 1 tablet (20 mg total) by mouth daily. 30 tablet 5    metoprolol tartrate 25 MG Oral Tab Take 0.5 tablets (12.5 mg total) by mouth 2 (two) times daily. 180 tablet 1    aspirin 81 MG Oral Chew Tab Chew 1 tablet (81 mg total) by mouth daily.      Multiple Vitamins-Minerals (MULTI-VITAMIN/MINERALS) Oral Tab Take 1 tablet by mouth daily.        Past Medical History:    Cancer  (HCC)    right ear    Essential hypertension    High blood pressure    High cholesterol    Hyperlipidemia    Myocardial infarct (HCC)    1 stent    Visual impairment    glasses      Past Surgical History:   Procedure Laterality Date    Hc embolization stent implant  2013    Other surgical history  05/23/2023    right ear surgery r/t basal cell      Social History:    Social History     Socioeconomic History    Marital status:    Tobacco Use    Smoking status: Former     Current packs/day: 0.00     Types: Cigarettes     Quit date: 10/30/2013     Years since quitting: 10.8    Smokeless tobacco: Never   Vaping Use    Vaping status: Never Used   Substance and Sexual Activity    Alcohol use: No     Comment: quit    Drug use: No   Other Topics Concern    Caffeine Concern Yes     Comment: 1-2 cups daily     Exercise Yes    Seat Belt Yes    Special Diet No    Stress Concern No    Weight Concern No     Social Determinants of Health      Received from [facility], [facility]    Social Connections    Received from [facility], [facility]    Housing Stability         REVIEW OF SYSTEMS:   GENERAL HEALTH: feels well otherwise  SKIN: denies any unusual skin lesions or rashes  RESPIRATORY: denies shortness of breath with exertion  CARDIOVASCULAR: denies chest pain on exertion  GI: denies abdominal pain and denies heartburn  NEURO: denies headaches    EXAM:   /82   Pulse 65   Temp 97.7 °F (36.5 °C)   Resp 16   Ht 5' 8\" (1.727 m)   Wt 218 lb (98.9 kg)   SpO2 97%   BMI 33.15 kg/m²   GENERAL: well developed, well nourished,in no apparent distress  SKIN: no rashes,no suspicious lesions  HEENT: atraumatic, normocephalic,ears and throat are clear  NECK: supple,no adenopathy,no bruits  LUNGS: clear to auscultation  CARDIO: RRR without murmur  GI: good BS's,no masses, HSM or tenderness  EXTREMITIES: no cyanosis, clubbing or edema    ASSESSMENT  AND PLAN:   Pt presents for a recheck of his hypertension, CAD and HLD. BP is well controlled, no significant medication side effects noted.  PLAN: will continue present medications, reviewed diet, exercise and weight control. The patient indicates understanding of these issues and agrees to the plan.  The patient is asked to return in 6 m.    History/Other:   Review of Systems  Current Outpatient Medications   Medication Sig Dispense Refill    OLMESARTAN MEDOXOMIL 40 MG Oral Tab TAKE ONE TABLET BY MOUTH ONE TIME DAILY 90 tablet 0    rosuvastatin (CRESTOR) 20 MG Oral Tab Take 1 tablet (20 mg total) by mouth daily. 30 tablet 5    metoprolol tartrate 25 MG Oral Tab Take 0.5 tablets (12.5 mg total) by mouth 2 (two) times daily. 180 tablet 1    aspirin 81 MG Oral Chew Tab Chew 1 tablet (81 mg total) by mouth daily.      Multiple Vitamins-Minerals (MULTI-VITAMIN/MINERALS) Oral Tab Take 1 tablet by mouth daily.       Allergies:  Allergies   Allergen Reactions    Shellfish-Derived Products HIVES    Ace Inhibitors Coughing       Objective:   Physical Exam    Assessment & Plan:   1. Mixed hyperlipidemia    2. Essential hypertension    3. CAD in native artery 2013 s/p MI 2013 Promus Elemant Plus stent LAD (distal) Dr. Isaacs        No orders of the defined types were placed in this encounter.      Meds This Visit:  Requested Prescriptions      No prescriptions requested or ordered in this encounter       Imaging & Referrals:  None

## 2024-09-24 ENCOUNTER — TELEPHONE (OUTPATIENT)
Dept: INTERNAL MEDICINE CLINIC | Facility: CLINIC | Age: 74
End: 2024-09-24

## 2024-09-25 DIAGNOSIS — E78.2 MIXED HYPERLIPIDEMIA: ICD-10-CM

## 2024-09-25 RX ORDER — ROSUVASTATIN CALCIUM 20 MG/1
20 TABLET, COATED ORAL DAILY
Qty: 30 TABLET | Refills: 2 | Status: SHIPPED | OUTPATIENT
Start: 2024-09-25

## 2024-09-25 NOTE — TELEPHONE ENCOUNTER
Last time medication was refilled 4/8/24  Last office visit  9/19/24  Next office visit due/scheduled   Future Appointments   Date Time Provider Department Center   3/18/2025  9:15 AM Star Perez MD [facility] [address]     Passed protocol, Medication sent.

## 2024-10-22 ENCOUNTER — OFFICE VISIT (OUTPATIENT)
Dept: INTERNAL MEDICINE CLINIC | Facility: CLINIC | Age: 74
End: 2024-10-22
Payer: COMMERCIAL

## 2024-10-22 VITALS
RESPIRATION RATE: 16 BRPM | SYSTOLIC BLOOD PRESSURE: 142 MMHG | TEMPERATURE: 97 F | OXYGEN SATURATION: 98 % | BODY MASS INDEX: 33.19 KG/M2 | DIASTOLIC BLOOD PRESSURE: 90 MMHG | WEIGHT: 219 LBS | HEART RATE: 76 BPM | HEIGHT: 68 IN

## 2024-10-22 DIAGNOSIS — M79.10 MYALGIA: ICD-10-CM

## 2024-10-22 DIAGNOSIS — I10 ESSENTIAL HYPERTENSION: ICD-10-CM

## 2024-10-22 DIAGNOSIS — G47.62 NOCTURNAL LEG CRAMPS: Primary | ICD-10-CM

## 2024-10-22 DIAGNOSIS — E78.2 MIXED HYPERLIPIDEMIA: ICD-10-CM

## 2024-10-22 PROCEDURE — 3008F BODY MASS INDEX DOCD: CPT

## 2024-10-22 PROCEDURE — 3080F DIAST BP >= 90 MM HG: CPT

## 2024-10-22 PROCEDURE — G2211 COMPLEX E/M VISIT ADD ON: HCPCS

## 2024-10-22 PROCEDURE — 3077F SYST BP >= 140 MM HG: CPT

## 2024-10-22 PROCEDURE — 99214 OFFICE O/P EST MOD 30 MIN: CPT

## 2024-10-22 NOTE — PROGRESS NOTES
Dustin Silva is a 74 year old male.  HPI:   HPI   Pt presents with c/o muscle aches in bilateral arms and legs x 1 year.  Pt has been on different hyperlipidemia agents such as atorvastatin, zetia  Has tried taking coQ 10 and magnesium without any improvement. Has stopped statin in the past for two weeks without any relief.     Pt has been experiencing night leg cramps for multiple years. Worse on the nights when he works as a .  Three days ago pt had an episode where he did experience left leg numbness during the night, after walking around the numbness resolved.     HTN- home systolic in range of 120's and diastolic in 70's.  Adherent to medication. Pt states he does have white coat syndrome.  Current Outpatient Medications   Medication Sig Dispense Refill    rosuvastatin 20 MG Oral Tab TAKE ONE TABLET BY MOUTH ONE TIME DAILY 30 tablet 2    OLMESARTAN MEDOXOMIL 40 MG Oral Tab TAKE ONE TABLET BY MOUTH ONE TIME DAILY 90 tablet 0    metoprolol tartrate 25 MG Oral Tab Take 0.5 tablets (12.5 mg total) by mouth 2 (two) times daily. 180 tablet 1    aspirin 81 MG Oral Chew Tab Chew 1 tablet (81 mg total) by mouth daily.      Multiple Vitamins-Minerals (MULTI-VITAMIN/MINERALS) Oral Tab Take 1 tablet by mouth daily.        Past Medical History:    Cancer (HCC)    right ear    Essential hypertension    High blood pressure    High cholesterol    Hyperlipidemia    Myocardial infarct (HCC)    1 stent    Visual impairment    glasses      Social History:  Social History     Socioeconomic History    Marital status:    Tobacco Use    Smoking status: Former     Current packs/day: 0.00     Types: Cigarettes     Quit date: 10/30/2013     Years since quitting: 10.9    Smokeless tobacco: Never   Vaping Use    Vaping status: Never Used   Substance and Sexual Activity    Alcohol use: No     Comment: quit    Drug use: No   Other Topics Concern    Caffeine Concern Yes     Comment: 1-2 cups daily     Exercise Yes     Seat Belt Yes    Special Diet No    Stress Concern No    Weight Concern No     Social Drivers of Health      Received from [facility], [facility]    Social Connections    Received from [facility], [facility]    Housing Stability        REVIEW OF SYSTEMS:   GENERAL HEALTH: feels well otherwise. Denies fever, chills, unintentional weight change  SKIN: denies any unusual skin lesions or rashes  RESPIRATORY: denies shortness of breath with exertion, denies cough or wheezing  CARDIOVASCULAR: denies chest pain or palpitations, denies leg swelling  GI: denies abdominal pain and denies heartburn. Denies nausea, vomiting, diarrhea, constipation  MUSC; has muscle aches, and nocturnal leg cramps   NEURO: denies headaches, dizziness, weakness, syncope  PSYCH: denies anxiety, depression, insomnia    EXAM:   /90   Pulse 76   Temp 97 °F (36.1 °C) (Temporal)   Resp 16   Ht 5' 8\" (1.727 m)   Wt 219 lb (99.3 kg)   SpO2 98%   BMI 33.30 kg/m²   GENERAL: well developed, well nourished,in no apparent distress  SKIN: no rashes,no suspicious lesions, warm and dry  HEENT: atraumatic, normocephalic,ears and throat are clear  NECK: supple,no adenopathy, no thyromegaly  LUNGS: clear to auscultation b/l no W/R/R  CARDIO: RRR without murmur  GI: good BS's,no masses, HSM, distension or tenderness  EXTREMITIES: no cyanosis, clubbing or edema  MUSCULOSKELETAL: FROM, no joint swelling or bony tenderness  NEURO: a/ox3, no focal deficits  PSYCH: mood and affect normal    ASSESSMENT AND PLAN:     Encounter Diagnoses   Name Primary?    Nocturnal leg cramps  -check ferritin, B12/folate  -provided patient to do leg stretches   -advised to take B-complex and vitamin E supplements Yes    Myalgia  -stop statin for 4 wks and see if myalgia improves. F/u in 4 wks     Essential hypertension  -controlled at home. Has white coat syndrome per patient. Will recheck at f/u      Mixed hyperlipidemia  -stop statin for now. F/u in 4wks      Requested Prescriptions      No prescriptions requested or ordered in this encounter         The patient indicates understanding of these issues and agrees to the plan.  The patient is asked to return in 4 wks.

## 2024-10-22 NOTE — PATIENT INSTRUCTIONS
Stop cholesterol medication for 4 weeks and monitor muscle pains. Follow up in 4 weeks.  For leg cramps- start doing provided leg stretches and taking supplements.       We suggest vitamin B complex (three times daily, containing 30 mg of vitamin B6) and vitamin E (800 international units before bed).

## 2024-10-23 ENCOUNTER — LAB ENCOUNTER (OUTPATIENT)
Dept: LAB | Age: 74
End: 2024-10-23
Payer: COMMERCIAL

## 2024-10-23 DIAGNOSIS — M79.10 MYALGIA: ICD-10-CM

## 2024-10-23 DIAGNOSIS — G47.62 NOCTURNAL LEG CRAMPS: ICD-10-CM

## 2024-10-23 LAB
DEPRECATED HBV CORE AB SER IA-ACNC: 89 NG/ML
FOLATE SERPL-MCNC: 20 NG/ML (ref 5.4–?)
VIT B12 SERPL-MCNC: 493 PG/ML (ref 211–911)

## 2024-10-23 PROCEDURE — 82746 ASSAY OF FOLIC ACID SERUM: CPT

## 2024-10-23 PROCEDURE — 82728 ASSAY OF FERRITIN: CPT

## 2024-10-23 PROCEDURE — 82607 VITAMIN B-12: CPT

## 2024-11-17 NOTE — PROGRESS NOTES
Dustin Silva is a 74 year old male.  HPI:   HPI   Pt presents today for HTN, myalgia, nocturnal leg cramps  f/u.  HTN- home systolic in range of 120's and diastolic in 70's.  Adherent to medication.      Pt has been experiencing muscle aches in bilateral arms and legs x 1 year. Worse the day of and following days after working as a .   Pt stopped statin for a month and it did not help with myalgia.  Has tried taking coQ 10 and magnesium without any improvement. Recent folate/B12 levels were WNL.   Experiences bilateral shoulder pain as well.    For nocturnal leg cramps patient has been doing leg stretches and taking supplements without any improvement.   Current Outpatient Medications   Medication Sig Dispense Refill    rosuvastatin 20 MG Oral Tab TAKE ONE TABLET BY MOUTH ONE TIME DAILY 30 tablet 2    OLMESARTAN MEDOXOMIL 40 MG Oral Tab TAKE ONE TABLET BY MOUTH ONE TIME DAILY 90 tablet 0    metoprolol tartrate 25 MG Oral Tab Take 0.5 tablets (12.5 mg total) by mouth 2 (two) times daily. 180 tablet 1    aspirin 81 MG Oral Chew Tab Chew 1 tablet (81 mg total) by mouth daily.      Multiple Vitamins-Minerals (MULTI-VITAMIN/MINERALS) Oral Tab Take 1 tablet by mouth daily.        Past Medical History:    Cancer (HCC)    right ear    Essential hypertension    High blood pressure    High cholesterol    Hyperlipidemia    Myocardial infarct (HCC)    1 stent    Visual impairment    glasses      Social History:  Social History     Socioeconomic History    Marital status:    Tobacco Use    Smoking status: Former     Current packs/day: 0.00     Types: Cigarettes     Quit date: 10/30/2013     Years since quittin.0    Smokeless tobacco: Never   Vaping Use    Vaping status: Never Used   Substance and Sexual Activity    Alcohol use: No     Comment: quit    Drug use: No   Other Topics Concern    Caffeine Concern Yes     Comment: 1-2 cups daily     Exercise Yes    Seat Belt Yes    Special Diet No     Stress Concern No    Weight Concern No     Social Drivers of Health      Received from [facility], [facility]    Social Connections    Received from [facility], [facility]    Housing Stability        REVIEW OF SYSTEMS:   GENERAL HEALTH: feels well otherwise. Denies fever, chills, unintentional weight change  SKIN: denies any unusual skin lesions or rashes  RESPIRATORY: denies shortness of breath with exertion, denies cough or wheezing  CARDIOVASCULAR: denies chest pain or palpitations, denies leg swelling  GI: denies abdominal pain and denies heartburn. Denies nausea, vomiting, diarrhea, constipation  NEURO: denies headaches, dizziness, weakness, syncope  PSYCH: denies anxiety, depression, insomnia    EXAM:   /72   Pulse 80   Temp 97.8 °F (36.6 °C)   Resp 16   Ht 5' 8\" (1.727 m)   Wt 214 lb (97.1 kg)   SpO2 98%   BMI 32.54 kg/m²   GENERAL: well developed, well nourished,in no apparent distress  SKIN: no rashes,no suspicious lesions, warm and dry  HEENT: atraumatic, normocephalic,ears and throat are clear  NECK: supple,no adenopathy, no thyromegaly  LUNGS: clear to auscultation b/l no W/R/R  CARDIO: RRR without murmur  GI: good BS's,no masses, HSM, distension or tenderness  EXTREMITIES: no cyanosis, clubbing or edema  MUSCULOSKELETAL: FROM, no joint swelling or bony tenderness  NEURO: a/ox3, no focal deficits  PSYCH: mood and affect normal    ASSESSMENT AND PLAN:     Encounter Diagnoses   Name Primary?    Essential hypertension  -controlled; CPM Yes    Mixed hyperlipidemia  -restart statin-not the cause of myalgia     Myalgia  -check autoimmune panel. Advised to start taking Ibuprofen 600mg daily with food     Pain of both shoulder joints  -check autoimmune panel. Advised to start taking Ibuprofen 600mg daily with food     Nocturnal leg cramps  -continue with leg stretches, supplements, start Benadryl 25mg nightly might  increase to 50mg in two weeks. Pt is advised to watch for drowsiness in the morning and discussed fall precautions.       Requested Prescriptions      No prescriptions requested or ordered in this encounter         The patient indicates understanding of these issues and agrees to the plan.  The patient is asked to return in 4 weeks.

## 2024-11-19 ENCOUNTER — OFFICE VISIT (OUTPATIENT)
Dept: INTERNAL MEDICINE CLINIC | Facility: CLINIC | Age: 74
End: 2024-11-19
Payer: COMMERCIAL

## 2024-11-19 ENCOUNTER — LAB ENCOUNTER (OUTPATIENT)
Dept: LAB | Age: 74
End: 2024-11-19
Payer: COMMERCIAL

## 2024-11-19 VITALS
HEIGHT: 68 IN | RESPIRATION RATE: 16 BRPM | TEMPERATURE: 98 F | DIASTOLIC BLOOD PRESSURE: 72 MMHG | WEIGHT: 214 LBS | SYSTOLIC BLOOD PRESSURE: 128 MMHG | OXYGEN SATURATION: 98 % | BODY MASS INDEX: 32.43 KG/M2 | HEART RATE: 80 BPM

## 2024-11-19 DIAGNOSIS — M25.512 PAIN OF BOTH SHOULDER JOINTS: ICD-10-CM

## 2024-11-19 DIAGNOSIS — E78.2 MIXED HYPERLIPIDEMIA: ICD-10-CM

## 2024-11-19 DIAGNOSIS — I10 ESSENTIAL HYPERTENSION: Primary | ICD-10-CM

## 2024-11-19 DIAGNOSIS — M79.10 MYALGIA: ICD-10-CM

## 2024-11-19 DIAGNOSIS — M25.511 PAIN OF BOTH SHOULDER JOINTS: ICD-10-CM

## 2024-11-19 DIAGNOSIS — G47.62 NOCTURNAL LEG CRAMPS: ICD-10-CM

## 2024-11-19 LAB
CRP SERPL-MCNC: 0.9 MG/DL (ref ?–0.5)
ERYTHROCYTE [SEDIMENTATION RATE] IN BLOOD: 33 MM/HR
RHEUMATOID FACT SERPL-ACNC: 6.9 IU/ML (ref ?–14)

## 2024-11-19 PROCEDURE — 85652 RBC SED RATE AUTOMATED: CPT

## 2024-11-19 PROCEDURE — 3078F DIAST BP <80 MM HG: CPT

## 2024-11-19 PROCEDURE — 86140 C-REACTIVE PROTEIN: CPT

## 2024-11-19 PROCEDURE — 86431 RHEUMATOID FACTOR QUANT: CPT

## 2024-11-19 PROCEDURE — G2211 COMPLEX E/M VISIT ADD ON: HCPCS

## 2024-11-19 PROCEDURE — 86038 ANTINUCLEAR ANTIBODIES: CPT

## 2024-11-19 PROCEDURE — 99214 OFFICE O/P EST MOD 30 MIN: CPT

## 2024-11-19 PROCEDURE — 3074F SYST BP LT 130 MM HG: CPT

## 2024-11-19 PROCEDURE — 3008F BODY MASS INDEX DOCD: CPT

## 2024-11-19 PROCEDURE — 86200 CCP ANTIBODY: CPT

## 2024-11-19 PROCEDURE — 86225 DNA ANTIBODY NATIVE: CPT

## 2024-11-19 NOTE — PATIENT INSTRUCTIONS
Restart cholesterol medication- Rosuvastatin  Trial Ibuprofen 600mg daily with food for muscle/joint pain  Continue with supplements for nocturnal muscle cramps. Start Benadryl 25mg at night for two weeks, if no resolutions in cramps increase the dose to Benadryl 50mg at night.

## 2024-11-22 LAB
CCP IGG SERPL-ACNC: 1.6 U/ML (ref 0–6.9)
DSDNA IGG SERPL IA-ACNC: 0.8 IU/ML
ENA AB SER QL IA: 0.2 UG/L
ENA AB SER QL IA: NEGATIVE

## 2024-12-07 RX ORDER — OLMESARTAN MEDOXOMIL 40 MG/1
40 TABLET ORAL DAILY
Qty: 90 TABLET | Refills: 0 | Status: SHIPPED | OUTPATIENT
Start: 2024-12-07

## 2024-12-07 NOTE — TELEPHONE ENCOUNTER
Last time medication was refilled 09/17/2024  Last office visit  11/19/2024  Next office visit due/scheduled   Future Appointments   Date Time Provider Department Center   12/17/2024  9:30 AM Kaitlin So APRN [facility]   3/18/2025  9:15 AM Star Perez MD [facility] [address]     Medication passed protocol, refill sent.

## 2024-12-17 ENCOUNTER — OFFICE VISIT (OUTPATIENT)
Dept: INTERNAL MEDICINE CLINIC | Facility: CLINIC | Age: 74
End: 2024-12-17
Payer: COMMERCIAL

## 2024-12-17 VITALS
DIASTOLIC BLOOD PRESSURE: 80 MMHG | RESPIRATION RATE: 18 BRPM | HEART RATE: 62 BPM | WEIGHT: 217 LBS | HEIGHT: 68 IN | BODY MASS INDEX: 32.89 KG/M2 | TEMPERATURE: 97 F | SYSTOLIC BLOOD PRESSURE: 130 MMHG | OXYGEN SATURATION: 97 %

## 2024-12-17 DIAGNOSIS — M25.511 PAIN OF BOTH SHOULDER JOINTS: ICD-10-CM

## 2024-12-17 DIAGNOSIS — G47.62 NOCTURNAL LEG CRAMPS: Primary | ICD-10-CM

## 2024-12-17 DIAGNOSIS — M25.512 PAIN OF BOTH SHOULDER JOINTS: ICD-10-CM

## 2024-12-17 DIAGNOSIS — R70.0 ELEVATED SED RATE: ICD-10-CM

## 2024-12-17 DIAGNOSIS — M79.10 MYALGIA: ICD-10-CM

## 2024-12-17 DIAGNOSIS — R79.82 ELEVATED C-REACTIVE PROTEIN (CRP): ICD-10-CM

## 2024-12-17 PROCEDURE — 3075F SYST BP GE 130 - 139MM HG: CPT

## 2024-12-17 PROCEDURE — 3079F DIAST BP 80-89 MM HG: CPT

## 2024-12-17 PROCEDURE — G2211 COMPLEX E/M VISIT ADD ON: HCPCS

## 2024-12-17 PROCEDURE — 3008F BODY MASS INDEX DOCD: CPT

## 2024-12-17 PROCEDURE — 99214 OFFICE O/P EST MOD 30 MIN: CPT

## 2024-12-17 RX ORDER — GABAPENTIN 300 MG/1
300 CAPSULE ORAL NIGHTLY
Qty: 60 CAPSULE | Refills: 0 | Status: SHIPPED | OUTPATIENT
Start: 2024-12-17 | End: 2025-02-15

## 2024-12-17 NOTE — PROGRESS NOTES
Dustin Silva is a 74 year old male.  HPI:   HPI   Pt presents today for f/u on nocturnal leg cramps. Patient has been doing leg stretches, taking supplements, using quinine water, taking Benadryl without any improvement. The leg cramping is nightly and affecting his sleep.    Pt has been experiencing muscle aches in bilateral arms and legs x 1 year. Pt also experiencing bilateral shoulder pain.  Pt stopped statin for a month and it did not help with myalgia.   C-reactive and sed rate were elevated. States his aunt had RA.    Current Outpatient Medications   Medication Sig Dispense Refill    gabapentin 300 MG Oral Cap Take 1 capsule (300 mg total) by mouth nightly. 60 capsule 0    OLMESARTAN MEDOXOMIL 40 MG Oral Tab TAKE ONE TABLET BY MOUTH ONE TIME DAILY 90 tablet 0    rosuvastatin 20 MG Oral Tab TAKE ONE TABLET BY MOUTH ONE TIME DAILY 30 tablet 2    metoprolol tartrate 25 MG Oral Tab Take 0.5 tablets (12.5 mg total) by mouth 2 (two) times daily. 180 tablet 1    aspirin 81 MG Oral Chew Tab Chew 1 tablet (81 mg total) by mouth daily.      Multiple Vitamins-Minerals (MULTI-VITAMIN/MINERALS) Oral Tab Take 1 tablet by mouth daily.        Past Medical History:    Cancer (HCC)    right ear    Essential hypertension    High blood pressure    High cholesterol    Hyperlipidemia    Myocardial infarct (HCC)    1 stent    Visual impairment    glasses      Social History:  Social History     Socioeconomic History    Marital status:    Tobacco Use    Smoking status: Former     Current packs/day: 0.00     Types: Cigarettes     Quit date: 10/30/2013     Years since quittin.1    Smokeless tobacco: Never   Vaping Use    Vaping status: Never Used   Substance and Sexual Activity    Alcohol use: No     Comment: quit    Drug use: No   Other Topics Concern    Caffeine Concern Yes     Comment: 1-2 cups daily     Exercise Yes    Seat Belt Yes    Special Diet No    Stress Concern No    Weight Concern No     Social  Drivers of Health      [facility]    Social Connections    [facility]    Housing Stability        REVIEW OF SYSTEMS:   GENERAL HEALTH: feels well otherwise. Denies fever, chills, unintentional weight change  SKIN: denies any unusual skin lesions or rashes  RESPIRATORY: denies shortness of breath with exertion, denies cough or wheezing  CARDIOVASCULAR: denies chest pain or palpitations, denies leg swelling  GI: denies abdominal pain and denies heartburn. Denies nausea, vomiting, diarrhea, constipation  NEURO: denies headaches, dizziness, weakness, syncope  PSYCH: denies anxiety, depression, insomnia    EXAM:   /80   Pulse 62   Temp 97.2 °F (36.2 °C) (Temporal)   Resp 18   Ht 5' 8\" (1.727 m)   Wt 217 lb (98.4 kg)   SpO2 97%   BMI 32.99 kg/m²   GENERAL: well developed, well nourished,in no apparent distress  SKIN: no rashes,no suspicious lesions, warm and dry  HEENT: atraumatic, normocephalic,ears and throat are clear  NECK: supple,no adenopathy, no thyromegaly  LUNGS: clear to auscultation b/l no W/R/R  CARDIO: RRR without murmur  GI: good BS's,no masses, HSM, distension or tenderness  EXTREMITIES: no cyanosis, clubbing or edema  MUSCULOSKELETAL: FROM, no joint swelling or bony tenderness  NEURO: a/ox3, no focal deficits  PSYCH: mood and affect normal    ASSESSMENT AND PLAN:     Encounter Diagnoses   Name Primary?    Nocturnal leg cramps  -trial Gabapentin at night. Pt is advised for possible drowsiness affect. Advised to continue with leg stretches     Elevated sed rate     Elevated C-reactive protein (CRP)     Myalgia  Pain in both shoulder joints     -referral to rheumatology provided  Requested Prescriptions     Signed Prescriptions Disp Refills    gabapentin 300 MG Oral Cap 60 capsule 0     Sig: Take 1 capsule (300 mg total) by mouth nightly.         The patient  indicates understanding of these issues and agrees to the plan.  The patient is asked to return if symptoms persist or worsen.

## 2024-12-27 DIAGNOSIS — E78.2 MIXED HYPERLIPIDEMIA: ICD-10-CM

## 2024-12-27 RX ORDER — ROSUVASTATIN CALCIUM 20 MG/1
20 TABLET, COATED ORAL DAILY
Qty: 30 TABLET | Refills: 3 | Status: SHIPPED | OUTPATIENT
Start: 2024-12-27

## 2024-12-27 NOTE — TELEPHONE ENCOUNTER
Last time medication was refilled 9/25/24  Last office visit  12/17/24  Next office visit due/scheduled   Future Appointments   Date Time Provider Department Center   3/18/2025  9:15 AM Star Perez MD [facility] [address] [facility]   8/19/2025  9:00 AM Yesenia Cooper DO [facility]   9/2/2025 11:15 AM Yesenia Cooper DO [facility]       Passed protocol, Medication sent.

## 2025-03-07 RX ORDER — OLMESARTAN MEDOXOMIL 40 MG/1
40 TABLET ORAL DAILY
Qty: 90 TABLET | Refills: 0 | Status: SHIPPED | OUTPATIENT
Start: 2025-03-07

## 2025-03-07 NOTE — TELEPHONE ENCOUNTER
Last time medication was refilled 12/7/24  Last office visit  12/17/24  Next office visit due/scheduled   Future Appointments   Date Time Provider Department Center   3/18/2025  9:15 AM Star Perez MD [facility]   8/19/2025  9:00 AM Yesenia Cooper DO [facility]   9/2/2025 11:15 AM Yesenia Cooper DO [facility]     Passed protocol, Medication sent.

## 2025-03-17 NOTE — PROGRESS NOTES
Subjective:   Patient ID: Dustin Silva is a 74 year old male.    HPI  Dustin Silva is a 74 year old male who presents for a complete physical exam.   HPI:   Pt reports normal state of health  Denies cp or SHIPLEY    PAST MEDICAL, SOCIAL, FAMILY HISTORIES REVIEWED WITH PT  .    Immunization History   Administered Date(s) Administered    Afluria, 9 Years & >, IM 10/30/2015    Covid-19 Vaccine Moderna 100 mcg/0.5 ml 02/13/2021, 03/13/2021, 10/25/2021    Covid-19 Vaccine Moderna Bivalent 50mcg/0.5mL 12+ years 10/18/2022    FLU VAC High Dose 65 YRS & Older PRSV Free (86702) 10/08/2021, 10/03/2022, 09/21/2023    FLU VAC QIV SPLIT 3 YRS AND OLDER (42379) 10/16/2019    FLUAD High Dose 65 yr and older (97684) 10/11/2017    FLUZONE 6 months and older PFS 0.5 ml (35058) 10/07/2016, 09/08/2020    Flublok Quad Influenza Vaccine (94330) 10/16/2019    Fluzone Vaccine Medicare () 09/26/2018, 10/08/2021    High Dose Fluzone Influenza Vaccine, 65yr+ PF 0.5mL (74066) 10/03/2022, 09/12/2024    Influenza 10/30/2015, 10/19/2018, 10/05/2020, 12/01/2022    Moderna Covid-19 Vaccine 50mcg/0.5ml 12yrs+ 09/23/2024    Pfizer Covid-19 Vaccine 30mcg/0.3ml 12yrs+ 10/13/2023    Pneumococcal (Prevnar 13) 11/13/2015    Pneumovax 23 10/19/2013    RSV, recombinant, RSVpreF, adjuvanted (Arexvy) 11/15/2023    TDAP 10/30/2015    Zoster Vaccine Live (Zostavax) 07/11/2018    Zoster Vaccine Recombinant Adjuvanted (Shingrix) 07/11/2018, 09/26/2018     Wt Readings from Last 6 Encounters:   03/18/25 215 lb (97.5 kg)   12/17/24 217 lb (98.4 kg)   11/19/24 214 lb (97.1 kg)   10/22/24 219 lb (99.3 kg)   09/19/24 218 lb (98.9 kg)   06/11/24 218 lb (98.9 kg)     Body mass index is 32.69 kg/m².     Lab Results   Component Value Date     (H) 03/19/2025     (H) 03/18/2025     (H) 03/21/2024     Lab Results   Component Value Date    CHOLEST 148 03/18/2025    CHOLEST 153 03/21/2024    CHOLEST 138 07/14/2023     Lab Results    Component Value Date    HDL 56 2025    HDL 69 (H) 2024    HDL 67 (H) 2023     Lab Results   Component Value Date    LDL 74 2025    LDL 72 2024    LDL 58 2023     Lab Results   Component Value Date    AST 34 (H) 2025    AST 23 2024    AST 25 2023     Lab Results   Component Value Date    ALT 26 2025    ALT 35 2024    ALT 31 2023     Lab Results   Component Value Date    PSA 2.57 2017    PSA 2.530 2016        Current Outpatient Medications   Medication Sig Dispense Refill    metoprolol tartrate 25 MG Oral Tab Take 0.5 tablets (12.5 mg total) by mouth 2 (two) times daily. 180 tablet 1    Olmesartan Medoxomil 40 MG Oral Tab Take 1 tablet (40 mg total) by mouth daily. 90 tablet 1    rosuvastatin 20 MG Oral Tab Take 1 tablet (20 mg total) by mouth daily. 90 tablet 3    Multiple Vitamins-Minerals (MULTI-VITAMIN/MINERALS) Oral Tab Take 1 tablet by mouth daily.        Past Medical History:    Cancer (HCC)    right ear    Essential hypertension    High blood pressure    High cholesterol    Hyperlipidemia    Myocardial infarct (HCC)    1 stent    Visual impairment    glasses      Past Surgical History:   Procedure Laterality Date    Hc embolization stent implant      Other surgical history  2023    right ear surgery r/t basal cell      Family History   Problem Relation Age of Onset    Heart Attack Father       Social History:  Social History     Socioeconomic History    Marital status:    Tobacco Use    Smoking status: Former     Current packs/day: 0.00     Types: Cigarettes     Quit date: 10/30/2013     Years since quittin.3    Smokeless tobacco: Never   Vaping Use    Vaping status: Never Used   Substance and Sexual Activity    Alcohol use: No     Comment: quit    Drug use: No   Other Topics Concern    Caffeine Concern Yes     Comment: 1-2 cups daily     Exercise Yes    Seat Belt Yes    Special Diet No    Stress Concern  No    Weight Concern No     Social Drivers of Health      Received [facility], [facility]    Housing Stability      Occ: yes. : yes. Children: yes.   Exercise: minimal.  Diet: watches sugar closely     REVIEW OF SYSTEMS:   GENERAL: feels well otherwise  A comprehensive 10 point review of systems was completed.     Pertinent positives and negatives noted in the HPI.      EXAM:   /72   Pulse 68   Temp 97.5 °F (36.4 °C)   Resp 16   Ht 5' 8\" (1.727 m)   Wt 215 lb (97.5 kg)   SpO2 96%   BMI 32.69 kg/m²   Body mass index is 32.69 kg/m².   GENERAL: well developed, well nourished,in no apparent distress  SKIN: no rashes,no suspicious lesions  HEENT: atraumatic, normocephalic,ears and throat are clear  EYES:PERRLA, EOMI, conjunctiva are clear  NECK: supple,no adenopathy,no bruits  LUNGS: clear to auscultation  CARDIO: RRR without murmur  GI: good BS's,no masses, HSM or tenderness  : deferred  MUSCULOSKELETAL: back is not tender  EXTREMITIES: no cyanosis, clubbing or edema  NEURO: Oriented times three,motor and sensory are grossly intact    ASSESSMENT AND PLAN:   Dustin Silva is a 74 year old male who presents for a complete physical exam. Body mass index is 32.69 kg/m²., recommended low fat diet and aerobic exercise 30 minutes three times weekly.     Health maintenance, will check fasting Lipids, CMP, CBC and PSA.     UTD with screening colonoscopy.     HTN and HLD -controlled. Cont current med therapy      Pt info handouts given for: exercise, low fat diet,    The patient indicates understanding of these issues and agrees to the plan.    The patient is asked to return for CPX in 12 m.    History/Other:   Review of Systems  Current Outpatient Medications   Medication Sig Dispense Refill    metoprolol tartrate 25 MG Oral Tab Take 0.5 tablets (12.5 mg total) by mouth 2 (two) times daily. 180 tablet 1    Olmesartan Medoxomil 40 MG Oral Tab Take 1  tablet (40 mg total) by mouth daily. 90 tablet 1    rosuvastatin 20 MG Oral Tab Take 1 tablet (20 mg total) by mouth daily. 90 tablet 3    Multiple Vitamins-Minerals (MULTI-VITAMIN/MINERALS) Oral Tab Take 1 tablet by mouth daily.       Allergies:Allergies[1]    Objective:   Physical Exam    Assessment & Plan:   1. Annual physical exam    2. Routine general medical examination at a health care facility    3. Prostate cancer screening    4. Essential hypertension    5. Mixed hyperlipidemia        Orders Placed This Encounter   Procedures    CBC With Differential With Platelet    Comp Metabolic Panel (14)    Lipid Panel    TSH W Reflex To Free T4    Urinalysis, Routine    PSA Total, Screen       Meds This Visit:  Requested Prescriptions     Signed Prescriptions Disp Refills    metoprolol tartrate 25 MG Oral Tab 180 tablet 1     Sig: Take 0.5 tablets (12.5 mg total) by mouth 2 (two) times daily.    Olmesartan Medoxomil 40 MG Oral Tab 90 tablet 1     Sig: Take 1 tablet (40 mg total) by mouth daily.    rosuvastatin 20 MG Oral Tab 90 tablet 3     Sig: Take 1 tablet (20 mg total) by mouth daily.       Imaging & Referrals:  None         [1]   Allergies  Allergen Reactions    Shellfish-Derived Products HIVES    Ace Inhibitors Coughing

## 2025-03-18 ENCOUNTER — OFFICE VISIT (OUTPATIENT)
Dept: INTERNAL MEDICINE CLINIC | Facility: CLINIC | Age: 75
End: 2025-03-18
Payer: COMMERCIAL

## 2025-03-18 ENCOUNTER — LAB ENCOUNTER (OUTPATIENT)
Dept: LAB | Age: 75
End: 2025-03-18
Attending: INTERNAL MEDICINE
Payer: COMMERCIAL

## 2025-03-18 DIAGNOSIS — E78.2 MIXED HYPERLIPIDEMIA: ICD-10-CM

## 2025-03-18 DIAGNOSIS — I10 ESSENTIAL HYPERTENSION: ICD-10-CM

## 2025-03-18 DIAGNOSIS — Z00.00 ROUTINE GENERAL MEDICAL EXAMINATION AT A HEALTH CARE FACILITY: ICD-10-CM

## 2025-03-18 DIAGNOSIS — Z00.00 ANNUAL PHYSICAL EXAM: Primary | ICD-10-CM

## 2025-03-18 DIAGNOSIS — Z12.5 PROSTATE CANCER SCREENING: ICD-10-CM

## 2025-03-18 LAB
ALBUMIN SERPL-MCNC: 4.9 G/DL (ref 3.2–4.8)
ALBUMIN/GLOB SERPL: 1.7 {RATIO} (ref 1–2)
ALP LIVER SERPL-CCNC: 81 U/L
ALT SERPL-CCNC: 26 U/L
ANION GAP SERPL CALC-SCNC: 9 MMOL/L (ref 0–18)
AST SERPL-CCNC: 34 U/L (ref ?–34)
BASOPHILS # BLD AUTO: 0.02 X10(3) UL (ref 0–0.2)
BASOPHILS NFR BLD AUTO: 0.3 %
BILIRUB SERPL-MCNC: 0.7 MG/DL (ref 0.2–1.1)
BILIRUB UR QL STRIP.AUTO: NEGATIVE
BUN BLD-MCNC: 30 MG/DL (ref 9–23)
CALCIUM BLD-MCNC: 9.8 MG/DL (ref 8.7–10.6)
CHLORIDE SERPL-SCNC: 107 MMOL/L (ref 98–112)
CHOLEST SERPL-MCNC: 148 MG/DL (ref ?–200)
CLARITY UR REFRACT.AUTO: CLEAR
CO2 SERPL-SCNC: 26 MMOL/L (ref 21–32)
COLOR UR AUTO: YELLOW
COMPLEXED PSA SERPL-MCNC: 5.46 NG/ML (ref ?–4)
CREAT BLD-MCNC: 1.59 MG/DL
EGFRCR SERPLBLD CKD-EPI 2021: 45 ML/MIN/1.73M2 (ref 60–?)
EOSINOPHIL # BLD AUTO: 0.19 X10(3) UL (ref 0–0.7)
EOSINOPHIL NFR BLD AUTO: 2.6 %
ERYTHROCYTE [DISTWIDTH] IN BLOOD BY AUTOMATED COUNT: 14.3 %
FASTING PATIENT LIPID ANSWER: YES
FASTING STATUS PATIENT QL REPORTED: YES
GLOBULIN PLAS-MCNC: 2.9 G/DL (ref 2–3.5)
GLUCOSE BLD-MCNC: 106 MG/DL (ref 70–99)
GLUCOSE UR STRIP.AUTO-MCNC: NORMAL MG/DL
HCT VFR BLD AUTO: 43.6 %
HDLC SERPL-MCNC: 56 MG/DL (ref 40–59)
HGB BLD-MCNC: 14.4 G/DL
IMM GRANULOCYTES # BLD AUTO: 0.03 X10(3) UL (ref 0–1)
IMM GRANULOCYTES NFR BLD: 0.4 %
KETONES UR STRIP.AUTO-MCNC: NEGATIVE MG/DL
LDLC SERPL CALC-MCNC: 74 MG/DL (ref ?–100)
LEUKOCYTE ESTERASE UR QL STRIP.AUTO: NEGATIVE
LYMPHOCYTES # BLD AUTO: 1.77 X10(3) UL (ref 1–4)
LYMPHOCYTES NFR BLD AUTO: 24.4 %
MCH RBC QN AUTO: 31.1 PG (ref 26–34)
MCHC RBC AUTO-ENTMCNC: 33 G/DL (ref 31–37)
MCV RBC AUTO: 94.2 FL
MONOCYTES # BLD AUTO: 1.03 X10(3) UL (ref 0.1–1)
MONOCYTES NFR BLD AUTO: 14.2 %
NEUTROPHILS # BLD AUTO: 4.22 X10 (3) UL (ref 1.5–7.7)
NEUTROPHILS # BLD AUTO: 4.22 X10(3) UL (ref 1.5–7.7)
NEUTROPHILS NFR BLD AUTO: 58.1 %
NITRITE UR QL STRIP.AUTO: NEGATIVE
NONHDLC SERPL-MCNC: 92 MG/DL (ref ?–130)
OSMOLALITY SERPL CALC.SUM OF ELEC: 301 MOSM/KG (ref 275–295)
PH UR STRIP.AUTO: 5 [PH] (ref 5–8)
PLATELET # BLD AUTO: 210 10(3)UL (ref 150–450)
POTASSIUM SERPL-SCNC: 5.2 MMOL/L (ref 3.5–5.1)
PROT SERPL-MCNC: 7.8 G/DL (ref 5.7–8.2)
PROT UR STRIP.AUTO-MCNC: NEGATIVE MG/DL
RBC # BLD AUTO: 4.63 X10(6)UL
RBC UR QL AUTO: NEGATIVE
SODIUM SERPL-SCNC: 142 MMOL/L (ref 136–145)
SP GR UR STRIP.AUTO: 1.02 (ref 1–1.03)
TRIGL SERPL-MCNC: 100 MG/DL (ref 30–149)
TSI SER-ACNC: 3.12 UIU/ML (ref 0.55–4.78)
UROBILINOGEN UR STRIP.AUTO-MCNC: NORMAL MG/DL
VLDLC SERPL CALC-MCNC: 15 MG/DL (ref 0–30)
WBC # BLD AUTO: 7.3 X10(3) UL (ref 4–11)

## 2025-03-18 PROCEDURE — 80061 LIPID PANEL: CPT | Performed by: INTERNAL MEDICINE

## 2025-03-18 PROCEDURE — 3008F BODY MASS INDEX DOCD: CPT | Performed by: INTERNAL MEDICINE

## 2025-03-18 PROCEDURE — 81003 URINALYSIS AUTO W/O SCOPE: CPT | Performed by: INTERNAL MEDICINE

## 2025-03-18 PROCEDURE — 80050 GENERAL HEALTH PANEL: CPT | Performed by: INTERNAL MEDICINE

## 2025-03-18 PROCEDURE — 84153 ASSAY OF PSA TOTAL: CPT | Performed by: INTERNAL MEDICINE

## 2025-03-18 PROCEDURE — 3078F DIAST BP <80 MM HG: CPT | Performed by: INTERNAL MEDICINE

## 2025-03-18 PROCEDURE — 99397 PER PM REEVAL EST PAT 65+ YR: CPT | Performed by: INTERNAL MEDICINE

## 2025-03-18 PROCEDURE — 3074F SYST BP LT 130 MM HG: CPT | Performed by: INTERNAL MEDICINE

## 2025-03-18 RX ORDER — METOPROLOL TARTRATE 25 MG/1
12.5 TABLET, FILM COATED ORAL 2 TIMES DAILY
Qty: 180 TABLET | Refills: 1 | Status: SHIPPED | OUTPATIENT
Start: 2025-03-18

## 2025-03-18 RX ORDER — ROSUVASTATIN CALCIUM 20 MG/1
20 TABLET, COATED ORAL DAILY
Qty: 90 TABLET | Refills: 3 | Status: SHIPPED | OUTPATIENT
Start: 2025-03-18

## 2025-03-18 RX ORDER — OLMESARTAN MEDOXOMIL 40 MG/1
40 TABLET ORAL DAILY
Qty: 90 TABLET | Refills: 1 | Status: SHIPPED | OUTPATIENT
Start: 2025-03-18

## 2025-03-19 ENCOUNTER — LAB ENCOUNTER (OUTPATIENT)
Dept: LAB | Age: 75
End: 2025-03-19
Attending: INTERNAL MEDICINE
Payer: COMMERCIAL

## 2025-03-19 VITALS
WEIGHT: 215 LBS | BODY MASS INDEX: 32.58 KG/M2 | TEMPERATURE: 98 F | DIASTOLIC BLOOD PRESSURE: 72 MMHG | RESPIRATION RATE: 16 BRPM | OXYGEN SATURATION: 96 % | HEIGHT: 68 IN | SYSTOLIC BLOOD PRESSURE: 128 MMHG | HEART RATE: 68 BPM

## 2025-03-19 DIAGNOSIS — N28.9 RENAL INSUFFICIENCY: Primary | ICD-10-CM

## 2025-03-19 DIAGNOSIS — E87.5 HYPERKALEMIA: ICD-10-CM

## 2025-03-19 DIAGNOSIS — N28.9 IMPAIRED RENAL FUNCTION: ICD-10-CM

## 2025-03-19 LAB
ANION GAP SERPL CALC-SCNC: 8 MMOL/L (ref 0–18)
BUN BLD-MCNC: 29 MG/DL (ref 9–23)
CALCIUM BLD-MCNC: 10.2 MG/DL (ref 8.7–10.6)
CHLORIDE SERPL-SCNC: 108 MMOL/L (ref 98–112)
CO2 SERPL-SCNC: 26 MMOL/L (ref 21–32)
CREAT BLD-MCNC: 1.47 MG/DL
EGFRCR SERPLBLD CKD-EPI 2021: 49 ML/MIN/1.73M2 (ref 60–?)
FASTING STATUS PATIENT QL REPORTED: YES
GLUCOSE BLD-MCNC: 105 MG/DL (ref 70–99)
OSMOLALITY SERPL CALC.SUM OF ELEC: 300 MOSM/KG (ref 275–295)
POTASSIUM SERPL-SCNC: 5.5 MMOL/L (ref 3.5–5.1)
SODIUM SERPL-SCNC: 142 MMOL/L (ref 136–145)

## 2025-03-19 PROCEDURE — 80048 BASIC METABOLIC PNL TOTAL CA: CPT | Performed by: INTERNAL MEDICINE

## 2025-04-14 ENCOUNTER — HOSPITAL ENCOUNTER (OUTPATIENT)
Dept: ULTRASOUND IMAGING | Age: 75
Discharge: HOME | End: 2025-04-14
Attending: INTERNAL MEDICINE
Payer: COMMERCIAL

## 2025-04-14 DIAGNOSIS — N28.9 RENAL INSUFFICIENCY: ICD-10-CM

## 2025-04-14 PROCEDURE — 76770 US EXAM ABDO BACK WALL COMP: CPT | Performed by: INTERNAL MEDICINE

## 2025-04-14 NOTE — PROGRESS NOTES
Written by Star Perez MD on 4/14/2025 12:58 PM CDT  Seen by patient Dsutin Silva on 4/14/2025  1:55 PM   labor/delivery

## 2025-04-23 ENCOUNTER — OFFICE VISIT (OUTPATIENT)
Dept: SURGERY | Facility: CLINIC | Age: 75
End: 2025-04-23
Payer: COMMERCIAL

## 2025-04-23 DIAGNOSIS — R97.20 ELEVATED PSA: ICD-10-CM

## 2025-04-23 DIAGNOSIS — R82.90 URINE FINDING: Primary | ICD-10-CM

## 2025-04-23 PROCEDURE — 51798 US URINE CAPACITY MEASURE: CPT | Performed by: UROLOGY

## 2025-04-23 PROCEDURE — G2211 COMPLEX E/M VISIT ADD ON: HCPCS | Performed by: UROLOGY

## 2025-04-23 PROCEDURE — 99203 OFFICE O/P NEW LOW 30 MIN: CPT | Performed by: UROLOGY

## 2025-04-23 NOTE — PROGRESS NOTES
Urology Clinic Note - New Patient    Referring Provider:  No referring provider defined for this encounter.     Primary Care Provider:  Star Perez MD     Chief Complaint:   Elevated PSA    HPI:     Dustin Silva is a 75 year old male with history of CHF, CAD status post stent on aspirin, HTN, GERD, HLD referred for elevated PSA.    Patient has not previously seen a urologist.  He has a history of kidney stone seen on imaging many years ago, most recent imaging without any stones.  He recently had an elevated PSA to 5.46 from 4.00 the year before.  He is now here for evaluation.  He reports no urinary symptoms.  No ED.  No abdominal surgeries.  No pertinent family history.  No pertinent social history, non-smoker.  Works as a .  Very active.  Recent UA with PCP negative.  PVR today 40 mL.    PSA:  Lab Results   Component Value Date    PSA 2.57 12/18/2017    PSA 2.530 12/14/2016    PSAS 5.46 (H) 03/18/2025    PSAS 4.00 03/21/2024    PSAS 3.78 07/14/2023    PSAS 3.74 01/06/2023    PSAS 2.88 01/06/2022    PSAS 2.71 01/05/2021    PSAS 2.29 01/03/2020    PSAS 3.75 01/07/2019    PSAS 2.30 11/05/2015      Last Cr was 1.47 done on 3/19/2025.      History:   Past Medical History[1]    Past Surgical History[2]    Family History[3]    Short Social Hx on File[4]    Medications (Active prior to today's visit):  Current Medications[5]    Allergies:  Allergies[6]      Review of Systems:   A comprehensive 10-point review of systems was completed.  Pertinent positives and negatives are noted in the the HPI.    Physical Exam:   CONSTITUTIONAL: Well developed, well nourished, in no acute distress  NEUROLOGIC: Alert and oriented  SKIN: No evident rashes  ABDOMEN: Soft, non-tender, non-distended   DIGITAL RECTAL EXAM: ~50 gram prostate, no nodules or tenderness    US KIDNEY/BLADDER (FGE=[id number])  Result Date: 4/14/2025  CONCLUSION:   1. Unremarkable kidneys.  2. 41 cc postvoid residual in the urinary bladder.    LOCATION:  [facility]     Dictated by (CST): Timoteo Arellano MD on 4/14/2025 at 10:36 AM     Finalized by (RACQUEL): Timoteo Arellano MD on 4/14/2025 at 10:37 AM           Assessment & Plan:     Elevated PSA  I had a long discussion with the patient regarding his elevated PSA level. I explained that PSA (prostate-specific antigen) is a protein that is secreted by prostate cells into the blood stream.  I reviewed the various causes of PSA elevation, both benign and malignant, including prostate cancer, benign prostatic hyperplasia, prostatitis, recent urinary tract instrumentation, urinary infections, and urinary retention.  In addition, a number of factors can alter the PSA level over time, including age, medications (such as finasteride), diet, herbal supplements, and of course surgical procedures on the prostate itself.    With an elevated PSA level, it is important to rule out prostate cancer as a potential cause.  The only way to reliably do this is with a biopsy of the prostate.  I explained that this is done under ultrasound guidance using a rectal probe.  This allows excellent visualization of the prostate, measurements of the size of the gland, as well as accurate placement of the biospy needle.  I emphasized that, although prostate biopsies are good at detecting prostate cancer, it is not 100%.  It is subject to sampling error, and there is a possibility of actually missing the area of the prostate with the cancer.  I assured the patient that we try to sample the areas that are most likely going to be involved with the cancer (usually at the periphery of the gland).  In cases of a repeat biopsy, we will also sample other areas, such as the transition zone or central areas of the prostate for a more complete assessment.     I also discussed the rationale for performing a multiparametric MRI of the prostate in order to better visualize the gland.  Any suspicious areas noted on the MRI can then be targeted during the  biopsy procedure by means of the [employer] MR-US-fusion guided prostate biopsy.  This may improve cancer detection rates and may allow for better prognostication.        Will schedule MRI prostate w/wo contrast   Will discuss results via phone call once resulted       Thank you for this consult.    I have personally reviewed all relevant medical records, labs, and imaging.       Shreyas Fair MD  Staff Urologist  [facility]  Office: [phone]           [1]   Past Medical History:   Cancer (HCC)    right ear    CHF (congestive heart failure) (HCC)    Essential hypertension    Heart disease    High blood pressure    High cholesterol    Hyperlipidemia    Myocardial infarct (HCC)    1 stent    Pain    Visual impairment    glasses   [2]   Past Surgical History:  Procedure Laterality Date    Cataract  3years ago    Hc embolization stent implant      Other surgical history  2023    right ear surgery r/t basal cell    Skin surgery      Tonsillectomy  5 years old   [3]   Family History  Problem Relation Age of Onset    Heart Attack Father     Cancer Father    [4]   Social History  Socioeconomic History    Marital status:    Tobacco Use    Smoking status: Former     Current packs/day: 0.00     Average packs/day: 1.5 packs/day for 48.0 years (72.0 ttl pk-yrs)     Types: Cigarettes     Quit date: 10/30/2013     Years since quittin.4    Smokeless tobacco: Never   Vaping Use    Vaping status: Never Used   Substance and Sexual Activity    Alcohol use: No     Comment: quit    Drug use: No   Other Topics Concern    Caffeine Concern Yes     Comment: 1-2 cups daily     Exercise Yes    Seat Belt Yes    Special Diet No    Stress Concern No    Weight Concern No     Social Drivers of Health      Received from [facility]    Housing Stability   [5]   Current Outpatient Medications   Medication Sig Dispense Refill    metoprolol tartrate 25 MG Oral Tab Take 0.5 tablets (12.5 mg total) by  mouth 2 (two) times daily. 180 tablet 1    Olmesartan Medoxomil 40 MG Oral Tab Take 1 tablet (40 mg total) by mouth daily. 90 tablet 1    rosuvastatin 20 MG Oral Tab Take 1 tablet (20 mg total) by mouth daily. 90 tablet 3    Multiple Vitamins-Minerals (MULTI-VITAMIN/MINERALS) Oral Tab Take 1 tablet by mouth daily.     [6]   Allergies  Allergen Reactions    Shellfish-Derived Products HIVES    Ace Inhibitors Coughing

## 2025-05-01 ENCOUNTER — OFFICE VISIT (OUTPATIENT)
Dept: NEPHROLOGY | Facility: CLINIC | Age: 75
End: 2025-05-01
Payer: COMMERCIAL

## 2025-05-01 VITALS — SYSTOLIC BLOOD PRESSURE: 148 MMHG | WEIGHT: 223.13 LBS | DIASTOLIC BLOOD PRESSURE: 92 MMHG | BODY MASS INDEX: 34 KG/M2

## 2025-05-01 DIAGNOSIS — N18.31 CKD STAGE 3A, GFR 45-59 ML/MIN (HCC): Primary | ICD-10-CM

## 2025-05-01 DIAGNOSIS — I10 ESSENTIAL HYPERTENSION: ICD-10-CM

## 2025-05-01 PROCEDURE — 3080F DIAST BP >= 90 MM HG: CPT | Performed by: INTERNAL MEDICINE

## 2025-05-01 PROCEDURE — 99203 OFFICE O/P NEW LOW 30 MIN: CPT | Performed by: INTERNAL MEDICINE

## 2025-05-01 PROCEDURE — 3077F SYST BP >= 140 MM HG: CPT | Performed by: INTERNAL MEDICINE

## 2025-05-01 NOTE — PROGRESS NOTES
Consult Note      REASON FOR CONSULT:  CKD 3a         HPI:   Dustin Silva is a 75 year old male with   Chief Complaint   Patient presents with    Consult     Ref'd by Dr. Perez for CKD     Star Perez MD    Mr. Silva was seen in the nephrology clinic today in consultation for management of chronic kidney disease stage IIIa in the setting of longstanding hypertension.  This is a 75-year-old man with past medical history significant for coronary artery disease status post stenting approximately 13 years ago, longstanding hypertension who was noted on routine labs March 18 to have a creatinine of 1.59 mg/dL.  Repeat creatinine on March 19 showed an elevated creatinine but improved at 1.47 mg/dL.  Review of records indicates that his creatinine has been essentially in the normal range for the past years.  Follow-up evaluation included a renal ultrasound which did not suggest obstructive uropathy but he also had a urinalysis with no microhematuria or proteinuria.  There have been no recent medication changes, no over-the-counter medications such as nonsteroidal anti-inflammatory agents and his review of systems is completed and is unremarkable.  He has no known family history of kidney disease blood pressures at home are generally in the 120s over 70s and he does check this 3 times weekly or so    ROS:    Denies fever/chills  Denies wt loss/gain  Denies HA or visual changes  Denies CP or palpitations  Denies SOB/cough/hemoptysis  Denies abd or flank pain  Denies N/V/D  Denies change in urinary habits or gross hematuria  Denies LE edema  Denies skin rashes/myalgias/arthralgias      PMH:  Past Medical History[1]      PSH:  Past Surgical History[2]      Medications (Active prior to today's visit):  Current Medications[3]      Allergies:  Allergies[4]    Social History:  Social Hx on file[5]       Family History:  Family History[6]         PHYSICAL EXAM:   BP (!) 148/92 (BP Location: Left arm, Patient Position:  Sitting)   Wt 223 lb 2 oz (101.2 kg)   BMI 33.93 kg/m²    Wt Readings from Last 6 Encounters:   05/01/25 223 lb 2 oz (101.2 kg)   03/18/25 215 lb (97.5 kg)   12/17/24 217 lb (98.4 kg)   11/19/24 214 lb (97.1 kg)   10/22/24 219 lb (99.3 kg)   09/19/24 218 lb (98.9 kg)     General: Alert and oriented in no apparent distress.  HEENT: No scleral icterus, MMM  Neck: Supple, no DANIEL or thyromegaly  Cardiac: Regular rate and rhythm, S1, S2 normal, no murmur or rub  Lungs: Clear without wheezes, rales, rhonchi.    Extremities: Without clubbing, cyanosis or edema.  Neurologic:  normal affect, cranial nerves grossly intact, moving all extremities  Skin: Warm and dry, no rashes      LABS:     Lab Results   Component Value Date     (H) 03/19/2025    BUN 29 (H) 03/19/2025    BUNCREA 12.9 01/05/2021    CREATSERUM 1.47 (H) 03/19/2025    ANIONGAP 8 03/19/2025    GFR 66 12/18/2017    GFRNAA 63 01/06/2022    GFRAA 73 01/06/2022    CA 10.2 03/19/2025    OSMOCALC 300 (H) 03/19/2025    ALKPHO 81 03/18/2025    AST 34 (H) 03/18/2025    ALT 26 03/18/2025    BILT 0.7 03/18/2025    TP 7.8 03/18/2025    ALB 4.9 (H) 03/18/2025    GLOBULIN 2.9 03/18/2025     03/19/2025    K 5.5 (H) 03/19/2025     03/19/2025    CO2 26.0 03/19/2025     Lab Results   Component Value Date    WBC 7.3 03/18/2025    RBC 4.63 03/18/2025    HGB 14.4 03/18/2025    HCT 43.6 03/18/2025    .0 03/18/2025    MCV 94.2 03/18/2025    MCH 31.1 03/18/2025    MCHC 33.0 03/18/2025    RDW 14.3 03/18/2025    NEPRELIM 4.22 03/18/2025    NEPERCENT 58.1 03/18/2025    LYPERCENT 24.4 03/18/2025    MOPERCENT 14.2 03/18/2025    EOPERCENT 2.6 03/18/2025    BAPERCENT 0.3 03/18/2025    NE 4.22 03/18/2025    LYMABS 1.77 03/18/2025    MOABSO 1.03 (H) 03/18/2025    EOABSO 0.19 03/18/2025    BAABSO 0.02 03/18/2025     No results found for: \"MALBP\", \"CREUR\", \"CREAURINE\", \"MIALBURINE\", \"MCRRATIOUR\", \"MALBCRECALC\", \"MICROALBUMIN\", \"CREAUR\", \"MALBCREACALC\"  Lab Results    Component Value Date    COLORUR Yellow 03/18/2025    CLARITY Clear 03/18/2025    SPECGRAVITY 1.020 03/18/2025    GLUUR Normal 03/18/2025    BILUR Negative 03/18/2025    KETUR Negative 03/18/2025    BLOODURINE Negative 03/18/2025    PHURINE 5.0 03/18/2025    PROUR Negative 03/18/2025    UROBILINOGEN Normal 03/18/2025    NITRITE Negative 03/18/2025    LEUUR Negative 03/18/2025    NMIC Microscopic not indicated 03/18/2025    WBCUR 1-4 01/03/2020    RBCUR 0-2 01/03/2020    EPIUR None Seen 01/03/2020    BACUR None Seen 01/03/2020          ASSESSMENT/PLAN:          1.  Chronic kidney disease stage IIIa-it does appear that he has developed chronic kidney disease which may be related to his history of hypertension.  There is no suggestion of an ongoing glomerular process by symptoms or by his urine studies and he does not have obstructive uropathy based on his most recent renal ultrasound.  There are no medication changes to explain his worsening kidney function and remainder of his history is unremarkable as well.  We did discuss that mainstay of therapy includes good blood pressure control including the use of medications which block the renin-angiotensin system.  His blood pressure is stable and he is on angiotensin receptor blocker.  I did ask him to repeat blood work and urine studies in 6 months    #2.  Hypertension-blood pressure is generally excellent at home he will continue his current doses of metoprolol and Benicar.    Thank you very much for allowing me to participate in care of your patient.  Please do not hesitate to call with any question or concerns.          Salbador Dawkins MD  5/1/2025  10:08 AM         [1]   Past Medical History:   Cancer (HCC)    right ear    CHF (congestive heart failure) (HCC)    Essential hypertension    Heart disease    High blood pressure    High cholesterol    Hyperlipidemia    Myocardial infarct (HCC)    1 stent    Pain    Visual impairment    glasses   [2]   Past Surgical  History:  Procedure Laterality Date    Cataract  3years ago    Hc embolization stent implant      Other surgical history  2023    right ear surgery r/t basal cell    Skin surgery      Tonsillectomy  5 years old   [3]   Current Outpatient Medications   Medication Sig Dispense Refill    metoprolol tartrate 25 MG Oral Tab Take 0.5 tablets (12.5 mg total) by mouth 2 (two) times daily. 180 tablet 1    Olmesartan Medoxomil 40 MG Oral Tab Take 1 tablet (40 mg total) by mouth daily. 90 tablet 1    rosuvastatin 20 MG Oral Tab Take 1 tablet (20 mg total) by mouth daily. 90 tablet 3    Multiple Vitamins-Minerals (MULTI-VITAMIN/MINERALS) Oral Tab Take 1 tablet by mouth daily.     [4]   Allergies  Allergen Reactions    Shellfish-Derived Products HIVES    Ace Inhibitors Coughing   [5]   Social History  Socioeconomic History    Marital status:    Tobacco Use    Smoking status: Former     Current packs/day: 0.00     Average packs/day: 1.5 packs/day for 48.0 years (72.0 ttl pk-yrs)     Types: Cigarettes     Quit date: 10/30/2013     Years since quittin.5    Smokeless tobacco: Never   Vaping Use    Vaping status: Never Used   Substance and Sexual Activity    Alcohol use: No     Comment: quit    Drug use: No   Other Topics Concern    Caffeine Concern Yes     Comment: 1-2 cups daily     Exercise Yes    Seat Belt Yes    Special Diet No    Stress Concern No    Weight Concern No   [6]   Family History  Problem Relation Age of Onset    Heart Attack Father     Cancer Father

## 2025-05-21 ENCOUNTER — HOSPITAL ENCOUNTER (OUTPATIENT)
Dept: MRI IMAGING | Facility: HOSPITAL | Age: 75
Discharge: HOME | End: 2025-05-21
Attending: UROLOGY
Payer: COMMERCIAL

## 2025-05-21 DIAGNOSIS — R97.20 ELEVATED PSA: ICD-10-CM

## 2025-05-21 PROCEDURE — A9575 INJ GADOTERATE MEGLUMI 0.1ML: HCPCS | Performed by: UROLOGY

## 2025-05-21 PROCEDURE — 72197 MRI PELVIS W/O & W/DYE: CPT | Performed by: UROLOGY

## 2025-05-21 RX ORDER — GADOTERATE MEGLUMINE 376.9 MG/ML
20 INJECTION INTRAVENOUS
Status: COMPLETED | OUTPATIENT
Start: 2025-05-21 | End: 2025-05-21

## 2025-05-21 RX ADMIN — GADOTERATE MEGLUMINE 20 ML: 376.9 INJECTION INTRAVENOUS at 09:04:00

## 2025-06-11 ENCOUNTER — TELEMEDICINE (OUTPATIENT)
Dept: SURGERY | Facility: CLINIC | Age: 75
End: 2025-06-11
Payer: COMMERCIAL

## 2025-06-11 ENCOUNTER — TELEPHONE (OUTPATIENT)
Dept: SURGERY | Facility: CLINIC | Age: 75
End: 2025-06-11

## 2025-06-11 DIAGNOSIS — R97.20 ELEVATED PSA: Primary | ICD-10-CM

## 2025-06-11 PROCEDURE — 98014 SYNCH AUDIO-ONLY EST MOD 30: CPT | Performed by: UROLOGY

## 2025-06-11 NOTE — PROGRESS NOTES
Urology Clinic Note  Telemedicine Visit  Audio Only  The patient consented to performing this visit virtually.     Primary Care Provider:  Star Perez MD     Chief Complaint:     Elev PSA    HPI:      Dustin Silva is a 75 year old male with history of CHF, CAD status post stent on aspirin, HTN, GERD, HLD referred for elevated PSA.     Patient has not previously seen a urologist.   Had prostaititis about 17 years ago. He has a history of kidney stone seen on imaging many years ago, most recent imaging without any stones.  He recently had an elevated PSA to 5.46 from 4.00 the year before.    He then saw me for evaluation.  He reports no urinary symptoms.  No erectile dysfunction.  No abdominal surgeries.  Overall very active.  He works as a .  No pertinent social history.  Non-smoker.  Recent UA with PCP negative.  PVR last 40 mL.  MRI was obtained on 5/21/25;     CONCLUSION:  1. Benign prostate hypertrophy.  2. Wedge like defect within the left lateral peripheral zone within the apex with slight capsular retraction likely represents scarring/fibrosis from prior prostatitis.  3. PI-RADS 2      Doing well today. Prostate >114 gm. No significant LUTS.         PSA:  Lab Results   Component Value Date    PSA 2.57 12/18/2017    PSA 2.530 12/14/2016    PSAS 5.46 (H) 03/18/2025    PSAS 4.00 03/21/2024    PSAS 3.78 07/14/2023    PSAS 3.74 01/06/2023    PSAS 2.88 01/06/2022    PSAS 2.71 01/05/2021    PSAS 2.29 01/03/2020    PSAS 3.75 01/07/2019    PSAS 2.30 11/05/2015        History:     Past Medical History:    Cancer (HCC)    right ear    CHF (congestive heart failure) (HCC)    Essential hypertension    Heart disease    High blood pressure    High cholesterol    Hyperlipidemia    Myocardial infarct (HCC)    1 stent    Pain    Visual impairment    glasses       Past Surgical History:   Procedure Laterality Date    Cataract  3years ago    Hc embolization stent implant  2013    Other surgical history   2023    right ear surgery r/t basal cell    Skin surgery      Tonsillectomy  5 years old       Family History   Problem Relation Age of Onset    Heart Attack Father     Cancer Father        Social History     Socioeconomic History    Marital status:    Tobacco Use    Smoking status: Former     Current packs/day: 0.00     Average packs/day: 1.5 packs/day for 48.0 years (72.0 ttl pk-yrs)     Types: Cigarettes     Quit date: 10/30/2013     Years since quittin.6    Smokeless tobacco: Never   Vaping Use    Vaping status: Never Used   Substance and Sexual Activity    Alcohol use: No     Comment: quit    Drug use: No   Other Topics Concern    Caffeine Concern Yes     Comment: 1-2 cups daily     Exercise Yes    Seat Belt Yes    Special Diet No    Stress Concern No    Weight Concern No     Social Drivers of Health      Received from [facility]    Housing Stability       Medications (Active prior to today's visit):  Current Outpatient Medications   Medication Sig Dispense Refill    metoprolol tartrate 25 MG Oral Tab Take 0.5 tablets (12.5 mg total) by mouth 2 (two) times daily. 180 tablet 1    Olmesartan Medoxomil 40 MG Oral Tab Take 1 tablet (40 mg total) by mouth daily. 90 tablet 1    rosuvastatin 20 MG Oral Tab Take 1 tablet (20 mg total) by mouth daily. 90 tablet 3    Multiple Vitamins-Minerals (MULTI-VITAMIN/MINERALS) Oral Tab Take 1 tablet by mouth daily.         Allergies:  Allergies   Allergen Reactions    Shellfish-Derived Products HIVES    Ace Inhibitors Coughing       Review of Systems:   A comprehensive 10-point review of systems was completed.  Pertinent positives and negatives are noted in the the HPI.    Physical Exam:   No physical exam performed during this telephone encounter.    Assessment & Plan:     Elev PSA  Discussed favorable PSA density, MRI findings. Discussed limitations of MRI, error rate. Options of prostate bx vs observation discussed; he wishes for  observation with PSA in 6mo.   Will arrange.   Did discuss option for BPH management; he is asymptomatic; will hold off for now.     In total, 30 minutes were spent on this patient encounter (including chart review, patient history, physical, and counseling, documentation, and communication).           Shreyas Fair MD  Staff Urologist  [facility]  Office: [phone]

## 2025-08-19 ENCOUNTER — LAB ENCOUNTER (OUTPATIENT)
Dept: LAB | Age: 75
End: 2025-08-19
Attending: INTERNAL MEDICINE

## 2025-08-19 ENCOUNTER — OFFICE VISIT (OUTPATIENT)
Dept: RHEUMATOLOGY | Facility: CLINIC | Age: 75
End: 2025-08-19

## 2025-08-19 VITALS
HEART RATE: 72 BPM | RESPIRATION RATE: 16 BRPM | OXYGEN SATURATION: 98 % | WEIGHT: 222 LBS | TEMPERATURE: 98 F | DIASTOLIC BLOOD PRESSURE: 70 MMHG | SYSTOLIC BLOOD PRESSURE: 142 MMHG | BODY MASS INDEX: 33.65 KG/M2 | HEIGHT: 68 IN

## 2025-08-19 DIAGNOSIS — M79.10 MYALGIA: Primary | ICD-10-CM

## 2025-08-19 DIAGNOSIS — M79.662 BILATERAL CALF PAIN: ICD-10-CM

## 2025-08-19 DIAGNOSIS — M41.24 OTHER IDIOPATHIC SCOLIOSIS, THORACIC REGION: ICD-10-CM

## 2025-08-19 DIAGNOSIS — Z51.81 ENCOUNTER FOR MONITORING STATIN THERAPY: ICD-10-CM

## 2025-08-19 DIAGNOSIS — M79.661 BILATERAL CALF PAIN: ICD-10-CM

## 2025-08-19 DIAGNOSIS — M79.10 MYALGIA: ICD-10-CM

## 2025-08-19 DIAGNOSIS — M54.50 CHRONIC MIDLINE LOW BACK PAIN WITHOUT SCIATICA: ICD-10-CM

## 2025-08-19 DIAGNOSIS — R70.0 ELEVATED SED RATE: ICD-10-CM

## 2025-08-19 DIAGNOSIS — Z79.899 ENCOUNTER FOR MONITORING STATIN THERAPY: ICD-10-CM

## 2025-08-19 DIAGNOSIS — R79.82 ELEVATED C-REACTIVE PROTEIN (CRP): ICD-10-CM

## 2025-08-19 DIAGNOSIS — G89.29 CHRONIC MIDLINE LOW BACK PAIN WITHOUT SCIATICA: ICD-10-CM

## 2025-08-19 LAB
CK SERPL-CCNC: 290 U/L (ref 46–171)
CRP SERPL-MCNC: 1.1 MG/DL (ref ?–0.5)
ERYTHROCYTE [SEDIMENTATION RATE] IN BLOOD: 28 MM/HR (ref 0–20)
LDH SERPL L TO P-CCNC: 227 U/L (ref 120–246)

## 2025-08-19 PROCEDURE — 3078F DIAST BP <80 MM HG: CPT | Performed by: INTERNAL MEDICINE

## 2025-08-19 PROCEDURE — 3077F SYST BP >= 140 MM HG: CPT | Performed by: INTERNAL MEDICINE

## 2025-08-19 PROCEDURE — 83615 LACTATE (LD) (LDH) ENZYME: CPT | Performed by: INTERNAL MEDICINE

## 2025-08-19 PROCEDURE — 83516 IMMUNOASSAY NONANTIBODY: CPT | Performed by: INTERNAL MEDICINE

## 2025-08-19 PROCEDURE — 99204 OFFICE O/P NEW MOD 45 MIN: CPT | Performed by: INTERNAL MEDICINE

## 2025-08-19 PROCEDURE — 82550 ASSAY OF CK (CPK): CPT | Performed by: INTERNAL MEDICINE

## 2025-08-19 PROCEDURE — 82085 ASSAY OF ALDOLASE: CPT | Performed by: INTERNAL MEDICINE

## 2025-08-19 PROCEDURE — 86140 C-REACTIVE PROTEIN: CPT | Performed by: INTERNAL MEDICINE

## 2025-08-19 PROCEDURE — 86235 NUCLEAR ANTIGEN ANTIBODY: CPT | Performed by: INTERNAL MEDICINE

## 2025-08-19 PROCEDURE — 3008F BODY MASS INDEX DOCD: CPT | Performed by: INTERNAL MEDICINE

## 2025-08-19 PROCEDURE — 85652 RBC SED RATE AUTOMATED: CPT | Performed by: INTERNAL MEDICINE

## 2025-08-20 LAB — ALDOLASE: 5.6 U/L

## 2025-08-22 LAB — ANTI-HMGCR AB: <20 CU

## 2025-08-28 ENCOUNTER — HOSPITAL ENCOUNTER (OUTPATIENT)
Dept: GENERAL RADIOLOGY | Facility: HOSPITAL | Age: 75
Discharge: HOME | End: 2025-08-28
Attending: INTERNAL MEDICINE

## 2025-08-28 DIAGNOSIS — M54.50 CHRONIC MIDLINE LOW BACK PAIN WITHOUT SCIATICA: ICD-10-CM

## 2025-08-28 DIAGNOSIS — M79.661 BILATERAL CALF PAIN: ICD-10-CM

## 2025-08-28 DIAGNOSIS — M41.24 OTHER IDIOPATHIC SCOLIOSIS, THORACIC REGION: ICD-10-CM

## 2025-08-28 DIAGNOSIS — M79.10 MYALGIA: ICD-10-CM

## 2025-08-28 DIAGNOSIS — M79.662 BILATERAL CALF PAIN: ICD-10-CM

## 2025-08-28 DIAGNOSIS — G89.29 CHRONIC MIDLINE LOW BACK PAIN WITHOUT SCIATICA: ICD-10-CM

## 2025-08-28 PROCEDURE — 72082 X-RAY EXAM ENTIRE SPI 2/3 VW: CPT | Performed by: INTERNAL MEDICINE

## 2025-08-28 PROCEDURE — 72110 X-RAY EXAM L-2 SPINE 4/>VWS: CPT | Performed by: INTERNAL MEDICINE

## 2025-08-30 LAB
ANTI-EJ: NEGATIVE
ANTI-JO-1: <20 UNITS
ANTI-KU: NEGATIVE
ANTI-MDA-5: <20 UNITS
ANTI-MI-2 AB: NEGATIVE
ANTI-NXP-2: <20 UNITS
ANTI-OJ: NEGATIVE
ANTI-PL-12: NEGATIVE
ANTI-PL-7: NEGATIVE
ANTI-PM/SCL100: <20 UNITS
ANTI-SAE1: <20 UNITS
ANTI-SRP AB: NEGATIVE
ANTI-SSA 52KD IGG: <20 UNITS
ANTI-TIF-1GAMMA: <20 UNITS
ANTI-U1 RNP: <20 UNITS
ANTI-U2 RNP: NEGATIVE
ANTI-U3 RNP: NEGATIVE

## (undated) DEVICE — SOL NACL IRRIG 0.9% 1000ML BTL

## (undated) DEVICE — DRAPE SRG 50X36IN HD TRBN STRL

## (undated) DEVICE — MEGADYNE E-Z CLEAN BLADE 2.75"

## (undated) DEVICE — HEAD & NECK: Brand: MEDLINE INDUSTRIES, INC.

## (undated) DEVICE — LAPAROTOMY SPONGE - RF AND X-RAY DETECTABLE PRE-WASHED: Brand: SITUATE

## (undated) DEVICE — 9534HP TRANSPARENT DRSG W/FRAME: Brand: 3M™ TEGADERM™

## (undated) DEVICE — HEAD AND NECK CDS-LF: Brand: MEDLINE INDUSTRIES, INC.

## (undated) DEVICE — PROXIMATE RH ROTATING HEAD SKIN STAPLERS (35 WIDE) CONTAINS 35 STAINLESS STEEL STAPLES: Brand: PROXIMATE

## (undated) DEVICE — STERILE POLYISOPRENE POWDER-FREE SURGICAL GLOVES: Brand: PROTEXIS

## (undated) DEVICE — BANDAGE,GAUZE,BULKEE II,4.5"X4.1YD,STRL: Brand: MEDLINE

## (undated) DEVICE — SUT PROLENE 4-0 RB-1 8557H

## (undated) DEVICE — TIP BOVIE 4" MEGADYNE

## (undated) DEVICE — 1 ML TUBERCULIN SYRINGE REGULAR TIP: Brand: MONOJECT

## (undated) DEVICE — DERMABOND CLOSURE 0.7ML TOPICL

## (undated) DEVICE — CABLE BIPOLAR 12FT DISPOSABLE

## (undated) DEVICE — SYRINGE BULB 50/CS 48/PLT: Brand: MEDEGEN MEDICAL PRODUCTS, LLC

## (undated) DEVICE — DISPOSABLE SHAH AURAL DRESSING: Brand: GYRUS ACMI

## (undated) DEVICE — OCCLUSIVE GAUZE STRIP,3% BISMUTH TRIBROMOPHENATE IN PETROLATUM BLEND: Brand: XEROFORM

## (undated) DEVICE — PREP BETADINE SOL 5% EYE

## (undated) DEVICE — SYRINGE 10ML LL CONTRL SYRINGE

## (undated) DEVICE — SUT VICRYL 3-0 SH J416H

## (undated) DEVICE — SUT PROLENE 5-0 RB-1 8556H

## (undated) DEVICE — PREMIUM WET SKIN PREP TRAY: Brand: MEDLINE INDUSTRIES, INC.

## (undated) DEVICE — SUT CHROMIC GUT 5-0 RB-1 U202H

## (undated) DEVICE — X-RAY DETECTABLE SPONGES,16 PLY: Brand: VISTEC

## (undated) DEVICE — SUT VICRYL 4-0 RB-1 J214H

## (undated) DEVICE — SUCTION CANISTER, 3000CC,SAFELINER: Brand: DEROYAL

## (undated) DEVICE — GAUZE,SPONGE,FLUFF,6"X6.75",STRL,5/TRAY: Brand: MEDLINE

## (undated) DEVICE — ENCORE® LATEX MICRO SIZE 7, STERILE LATEX POWDER-FREE SURGICAL GLOVE: Brand: ENCORE

## (undated) DEVICE — GAMMEX® PI HYBRID SIZE 6.5, STERILE POWDER-FREE SURGICAL GLOVE, POLYISOPRENE AND NEOPRENE BLEND: Brand: GAMMEX

## (undated) DEVICE — TOWEL SURG OR 17X30IN BLUE

## (undated) DEVICE — MEGADYNE ELECTRODE ADULT PT RT

## (undated) DEVICE — SUT MONOCRYL 4-0 PS-2 Y496G

## (undated) DEVICE — SUT VICRYL 5-0 RB-1 J213H

## (undated) DEVICE — Device

## (undated) DEVICE — ELECTRODE ESURG 2.75IN EZ CLN

## (undated) DEVICE — SKIN MARKER DUAL TIP WITH RULER CAP AND LABELS: Brand: DEVON

## (undated) NOTE — LETTER
To: Dr. Bennett Hernandez  Date:  5/5/2023  Patient Name: Ashwin Arvizu / Sex: 4/8/1950-A: 74 y  male  Medical Records: [id number]   CSN: [id number]      Clearance for Surgery Requested by Surgeon    Request for:  Cardiac Clearance    Requested by Surgeon: Elías Perales MD    Surgical Date: 5/23/2023    Procedure: Right ear reconstruction with local tissue rearrangement possible skin graft, possible integra, possible flap, possible cartilage graft, Right      Please fax the clearance note to the [facility]  department. Thank you.

## (undated) NOTE — LETTER
Patient Name: Kiley Jackson / Sex: 4/8/1950-A: 74 y  male   Medical Records: [id number]    CSN: [id number]    CBC Testing  SURGERY DATE: 5/23/2023            PROCEDURE:  Right ear reconstruction with local tissue rearrangement possible skin graft, possible integra, possible flap, possible cartilage graft  The above patient had a CBC without differential screen done on 2/28/23, but a CBC with differential was ordered. Is it okay to use this testing for the above scheduled procedure? Yes ______  No, repeat the test _______    _________________________________________              ____________  MD signature       Date    Please return this completed and signed response to [phone]    Thank you.

## (undated) NOTE — LETTER
OUTSIDE TESTING RESULT REQUEST     IMPORTANT: FOR YOUR IMMEDIATE ATTENTION  Please FAX all test results listed below to: [phone]     Testing already done on or about: 3/2023     * * * * If testing is NOT complete, arrange with patient A.S.A.P. * * * *      Patient Name: Ana Gallagher  Surgery Date: 2023  Medical Record: [id number]  CSN: [id number]  : 1950 - A: 74 y     Sex: male  Surgeon(s):  Camilla Knapp MD  Procedure: Right ear reconstruction with local tissue rearrangement possible skin graft, possible integra, possible flap, possible cartilage graft  Anesthesia Type: General     Surgeon: Camilla Knapp MD     The following Testing and Time Line are REQUIRED PER ANESTHESIA     EKG READ AND SIGNED WITHIN   90 days      Thank Bo Guerrero,   Sent by: Valere Angelucci

## (undated) NOTE — LETTER
Patient Name: Clarissa Chinchilla / Sex: 4/8/1950-A: 74 y  male   Medical Records: [id number]    CSN: [id number]    CBC Testing  SURGERY DATE: 5/23/2023            PROCEDURE:  Right ear reconstruction with local tissue rearrangement possible skin graft, possible integra, possible flap, possible cartilage graft  The above patient had a CBC without differential screen done on 2/27/2023, but a CBC with differential was ordered. Is it okay to use this testing for the above scheduled procedure? Yes ______  No, repeat the test _______    _________________________________________              ____________  MD signature       Date    Please return this completed and signed response to [phone]    Thank you.

## (undated) NOTE — LETTER
To: Dr. Goodson Meter  Date:  5/5/2023  Patient Name: Stanislav Gusman / Sex: 4/8/1950-A: 74 y  male  Medical Records: [id number]   CSN: [id number]      Clearance for Surgery Requested by Surgeon    Request for:  Medical Clearance    Requested by Surgeon: Michael Escamilla MD    Surgical Date: 5/23/2023    Procedure: Right ear reconstruction with local tissue rearrangement possible skin graft, possible integra, possible flap, possible cartilage graft, Right      Please fax the clearance note to the [facility]  department. Thank you.